# Patient Record
Sex: MALE | Race: WHITE | URBAN - NONMETROPOLITAN AREA
[De-identification: names, ages, dates, MRNs, and addresses within clinical notes are randomized per-mention and may not be internally consistent; named-entity substitution may affect disease eponyms.]

---

## 2017-02-15 ENCOUNTER — DOCTOR'S OFFICE (OUTPATIENT)
Dept: URBAN - NONMETROPOLITAN AREA CLINIC 2 | Facility: CLINIC | Age: 42
Setting detail: OPHTHALMOLOGY
End: 2017-02-15
Payer: COMMERCIAL

## 2017-02-15 ENCOUNTER — RX ONLY (RX ONLY)
Age: 42
End: 2017-02-15

## 2017-02-15 DIAGNOSIS — H50.331: ICD-10-CM

## 2017-02-15 PROCEDURE — 92004 COMPRE OPH EXAM NEW PT 1/>: CPT | Performed by: OPHTHALMOLOGY

## 2017-02-15 PROCEDURE — 92060 SENSORIMOTOR EXAMINATION: CPT | Performed by: OPHTHALMOLOGY

## 2017-02-15 ASSESSMENT — REFRACTION_MANIFEST
OU_VA: 20/
OS_VA1: 20/
OD_VA3: 20/
OD_VA1: 20/
OS_VA1: 20/
OD_VA2: 20/
OS_VA1: 20/
OD_VA2: 20/
OS_VA3: 20/
OS_VA2: 20/
OS_VA3: 20/
OD_VA1: 20/
OD_VA1: 20/
OD_VA3: 20/
OU_VA: 20/
OU_VA: 20/
OS_VA3: 20/
OD_VA3: 20/
OS_VA2: 20/
OS_VA2: 20/
OD_VA2: 20/

## 2017-02-15 ASSESSMENT — REFRACTION_CURRENTRX
OD_OVR_VA: 20/
OD_AXIS: 003
OS_OVR_VA: 20/
OD_SPHERE: -0.50
OD_OVR_VA: 20/
OD_OVR_VA: 20/
OS_AXIS: 035
OS_SPHERE: +0.25
OD_CYLINDER: -0.50
OS_OVR_VA: 20/
OS_CYLINDER: -0.50
OS_OVR_VA: 20/

## 2017-02-15 ASSESSMENT — REFRACTION_AUTOREFRACTION
OS_AXIS: 044
OS_SPHERE: +0.25
OD_SPHERE: -0.50
OD_CYLINDER: -1.50
OS_CYLINDER: -0.50
OD_AXIS: 167

## 2017-02-15 ASSESSMENT — VISUAL ACUITY
OD_BCVA: 20/25-2
OS_BCVA: 20/25

## 2017-02-15 ASSESSMENT — CONFRONTATIONAL VISUAL FIELD TEST (CVF)
OD_FINDINGS: FULL
OS_FINDINGS: FULL

## 2017-02-15 ASSESSMENT — SPHEQUIV_DERIVED
OD_SPHEQUIV: -1.25
OS_SPHEQUIV: 0

## 2017-04-19 ENCOUNTER — OPTICAL OFFICE (OUTPATIENT)
Dept: URBAN - NONMETROPOLITAN AREA CLINIC 5 | Facility: CLINIC | Age: 42
Setting detail: OPHTHALMOLOGY
End: 2017-04-19

## 2017-04-19 ENCOUNTER — DOCTOR'S OFFICE (OUTPATIENT)
Dept: URBAN - NONMETROPOLITAN AREA CLINIC 2 | Facility: CLINIC | Age: 42
Setting detail: OPHTHALMOLOGY
End: 2017-04-19
Payer: COMMERCIAL

## 2017-04-19 DIAGNOSIS — H52.223: ICD-10-CM

## 2017-04-19 DIAGNOSIS — H52.03: ICD-10-CM

## 2017-04-19 PROCEDURE — 92015 DETERMINE REFRACTIVE STATE: CPT | Performed by: OPTOMETRIST

## 2017-04-19 PROCEDURE — V2103 SPHEROCYLINDR 4.00D/12-2.00D: HCPCS | Performed by: OPTOMETRIST

## 2017-04-19 PROCEDURE — V2020 VISION SVCS FRAMES PURCHASES: HCPCS | Performed by: OPTOMETRIST

## 2017-04-19 PROCEDURE — V2750 ANTI-REFLECTIVE COATING: HCPCS | Performed by: OPTOMETRIST

## 2017-04-19 ASSESSMENT — REFRACTION_CURRENTRX
OD_OVR_VA: 20/
OD_AXIS: 003
OS_OVR_VA: 20/
OD_OVR_VA: 20/
OD_CYLINDER: -0.50
OS_SPHERE: +0.25
OS_VPRISM_DIRECTION: SV
OD_SPHERE: -0.50
OS_OVR_VA: 20/
OD_VPRISM_DIRECTION: SV
OD_OVR_VA: 20/
OS_AXIS: 035
OS_CYLINDER: -0.50
OS_OVR_VA: 20/

## 2017-04-19 ASSESSMENT — REFRACTION_MANIFEST
OU_VA: 20/
OS_VA3: 20/
OD_VA3: 20/
OS_VA1: 20/
OD_VA1: 20/
OD_VA3: 20/
OS_VA3: 20/
OS_VA1: 20/
OU_VA: 20/
OD_VA2: 20/
OD_VA2: 20/
OS_VA2: 20/
OD_VA1: 20/
OS_VA2: 20/

## 2017-04-19 ASSESSMENT — REFRACTION_OUTSIDERX
OD_SPHERE: PL
OS_AXIS: 040
OS_VA2: 20/20
OS_CYLINDER: -0.50
OD_CYLINDER: -0.75
OS_VA1: 20/20
OD_VA2: 20/25
OD_VA1: 20/25
OS_VA3: 20/
OD_AXIS: 170
OD_VA3: 20/
OU_VA: 20/20
OS_SPHERE: +0.25

## 2017-04-19 ASSESSMENT — REFRACTION_AUTOREFRACTION
OD_AXIS: 140
OD_CYLINDER: -1.25
OS_CYLINDER: -0.75
OS_SPHERE: +0.50
OD_SPHERE: -2.00
OS_AXIS: 067

## 2017-04-19 ASSESSMENT — KERATOMETRY
OS_K2POWER_DIOPTERS: 44.50
OS_K1POWER_DIOPTERS: 44.25
OS_AXISANGLE_DEGREES: 003

## 2017-04-19 ASSESSMENT — VISUAL ACUITY
OS_BCVA: 20/25
OD_BCVA: 20/20

## 2017-04-19 ASSESSMENT — SPHEQUIV_DERIVED
OD_SPHEQUIV: -2.625
OS_SPHEQUIV: 0.125

## 2017-04-19 ASSESSMENT — AXIALLENGTH_DERIVED: OS_AL: 23.2275

## 2019-12-30 ENCOUNTER — OPTICAL OFFICE (OUTPATIENT)
Dept: URBAN - NONMETROPOLITAN AREA CLINIC 5 | Facility: CLINIC | Age: 44
Setting detail: OPHTHALMOLOGY
End: 2019-12-30
Payer: COMMERCIAL

## 2019-12-30 ENCOUNTER — DOCTOR'S OFFICE (OUTPATIENT)
Dept: URBAN - NONMETROPOLITAN AREA CLINIC 2 | Facility: CLINIC | Age: 44
Setting detail: OPHTHALMOLOGY
End: 2019-12-30
Payer: COMMERCIAL

## 2019-12-30 DIAGNOSIS — H52.223: ICD-10-CM

## 2019-12-30 DIAGNOSIS — H52.03: ICD-10-CM

## 2019-12-30 DIAGNOSIS — H50.331: ICD-10-CM

## 2019-12-30 PROCEDURE — V2784 LENS POLYCARB OR EQUAL: HCPCS | Performed by: OPTOMETRIST

## 2019-12-30 PROCEDURE — 92015 DETERMINE REFRACTIVE STATE: CPT | Performed by: OPTOMETRIST

## 2019-12-30 PROCEDURE — V2103 SPHEROCYLINDR 4.00D/12-2.00D: HCPCS | Performed by: OPTOMETRIST

## 2019-12-30 PROCEDURE — V2020 VISION SVCS FRAMES PURCHASES: HCPCS | Performed by: OPTOMETRIST

## 2019-12-30 PROCEDURE — V2102 SINGL VISN SPHERE 7.12-20.00: HCPCS | Performed by: OPTOMETRIST

## 2019-12-30 PROCEDURE — 92014 COMPRE OPH EXAM EST PT 1/>: CPT | Performed by: OPTOMETRIST

## 2019-12-30 ASSESSMENT — REFRACTION_CURRENTRX
OS_AXIS: 035
OD_VPRISM_DIRECTION: SV
OS_OVR_VA: 20/
OS_SPHERE: +0.25
OD_SPHERE: PL
OS_CYLINDER: -0.50
OD_CYLINDER: -0.75
OD_OVR_VA: 20/
OS_VPRISM_DIRECTION: SV
OD_AXIS: 170

## 2019-12-30 ASSESSMENT — REFRACTION_MANIFEST
OS_CYLINDER: -0.75
OS_VA3: 20/
OD_SPHERE: PL
OS_VA2: 20/25
OS_SPHERE: +0.25
OD_VA1: 20/20-2
OD_AXIS: 170
OS_AXIS: 040
OD_VA3: 20/
OS_VA1: 20/20
OU_VA: 20/20
OD_CYLINDER: -0.75
OD_VA2: 20/25

## 2019-12-30 ASSESSMENT — SPHEQUIV_DERIVED
OS_SPHEQUIV: -0.5
OS_SPHEQUIV: -0.125
OD_SPHEQUIV: -1.375

## 2019-12-30 ASSESSMENT — KERATOMETRY
OS_K1POWER_DIOPTERS: 44.25
OD_AXISANGLE_DEGREES: 003
OS_AXISANGLE_DEGREES: 027
OS_K2POWER_DIOPTERS: 45.00
OD_K2POWER_DIOPTERS: 45.75
OD_K1POWER_DIOPTERS: 44.50

## 2019-12-30 ASSESSMENT — CONFRONTATIONAL VISUAL FIELD TEST (CVF)
OS_FINDINGS: FULL
OD_FINDINGS: FULL

## 2019-12-30 ASSESSMENT — REFRACTION_AUTOREFRACTION
OS_CYLINDER: -1.50
OS_SPHERE: +0.25
OD_SPHERE: -0.50
OS_AXIS: 016
OD_AXIS: 008
OD_CYLINDER: -1.75

## 2019-12-30 ASSESSMENT — AXIALLENGTH_DERIVED
OS_AL: 23.3755
OD_AL: 23.5309
OS_AL: 23.2329

## 2019-12-30 ASSESSMENT — VISUAL ACUITY
OS_BCVA: 20/25-1
OD_BCVA: 20/20

## 2021-05-03 ENCOUNTER — OPTICAL OFFICE (OUTPATIENT)
Dept: URBAN - NONMETROPOLITAN AREA CLINIC 5 | Facility: CLINIC | Age: 46
Setting detail: OPHTHALMOLOGY
End: 2021-05-03
Payer: COMMERCIAL

## 2021-05-03 ENCOUNTER — DOCTOR'S OFFICE (OUTPATIENT)
Dept: URBAN - NONMETROPOLITAN AREA CLINIC 2 | Facility: CLINIC | Age: 46
Setting detail: OPHTHALMOLOGY
End: 2021-05-03
Payer: COMMERCIAL

## 2021-05-03 DIAGNOSIS — H52.223: ICD-10-CM

## 2021-05-03 DIAGNOSIS — H52.4: ICD-10-CM

## 2021-05-03 DIAGNOSIS — H50.331: ICD-10-CM

## 2021-05-03 PROCEDURE — V2103 SPHEROCYLINDR 4.00D/12-2.00D: HCPCS | Performed by: OPTOMETRIST

## 2021-05-03 PROCEDURE — V2784 LENS POLYCARB OR EQUAL: HCPCS | Performed by: OPTOMETRIST

## 2021-05-03 PROCEDURE — V2102 SINGL VISN SPHERE 7.12-20.00: HCPCS | Performed by: OPTOMETRIST

## 2021-05-03 PROCEDURE — V2020 VISION SVCS FRAMES PURCHASES: HCPCS | Performed by: OPTOMETRIST

## 2021-05-03 PROCEDURE — 92015 DETERMINE REFRACTIVE STATE: CPT | Performed by: OPTOMETRIST

## 2021-05-03 PROCEDURE — 92014 COMPRE OPH EXAM EST PT 1/>: CPT | Performed by: OPTOMETRIST

## 2021-05-03 ASSESSMENT — REFRACTION_MANIFEST
OD_AXIS: 180
OD_CYLINDER: -0.75
OU_VA: 20/20
OS_ADD: +1.50
OS_CYLINDER: -0.75
OS_VA2: 20/25
OS_SPHERE: -0.25
OD_VA2: 20/25
OS_VA1: 20/20
OD_SPHERE: -0.50
OD_VA1: 20/20-2
OS_AXIS: 040
OD_ADD: +1.50

## 2021-05-03 ASSESSMENT — REFRACTION_CURRENTRX
OS_CYLINDER: -0.75
OD_AXIS: 170
OD_VPRISM_DIRECTION: SV
OS_OVR_VA: 20/
OS_SPHERE: +0.25
OD_SPHERE: PL
OD_CYLINDER: -0.75
OS_VPRISM_DIRECTION: SV
OD_OVR_VA: 20/
OS_AXIS: 041

## 2021-05-03 ASSESSMENT — REFRACTION_AUTOREFRACTION
OS_SPHERE: -0.25
OS_CYLINDER: -0.50
OD_SPHERE: -0.50
OD_AXIS: 002
OD_CYLINDER: -0.75
OS_AXIS: 019

## 2021-05-03 ASSESSMENT — KERATOMETRY
OD_K2POWER_DIOPTERS: 45.75
OS_AXISANGLE_DEGREES: 039
OD_K1POWER_DIOPTERS: 45.25
OD_AXISANGLE_DEGREES: 084
OS_K2POWER_DIOPTERS: 45.25
OS_K1POWER_DIOPTERS: 44.75

## 2021-05-03 ASSESSMENT — AXIALLENGTH_DERIVED
OD_AL: 23.2047
OD_AL: 23.2047
OS_AL: 23.241
OS_AL: 23.2884

## 2021-05-03 ASSESSMENT — TONOMETRY
OD_IOP_MMHG: 15
OS_IOP_MMHG: 15

## 2021-05-03 ASSESSMENT — CONFRONTATIONAL VISUAL FIELD TEST (CVF)
OS_FINDINGS: FULL
OD_FINDINGS: FULL

## 2021-05-03 ASSESSMENT — SPHEQUIV_DERIVED
OS_SPHEQUIV: -0.625
OD_SPHEQUIV: -0.875
OD_SPHEQUIV: -0.875
OS_SPHEQUIV: -0.5

## 2021-05-03 ASSESSMENT — VISUAL ACUITY
OD_BCVA: 20/20-2
OS_BCVA: 20/30

## 2021-10-25 LAB
EXTERNAL HIV SCREEN: NORMAL
HCV AB SER-ACNC: NEGATIVE

## 2022-05-02 ENCOUNTER — DOCTOR'S OFFICE (OUTPATIENT)
Dept: URBAN - NONMETROPOLITAN AREA CLINIC 1 | Facility: CLINIC | Age: 47
Setting detail: OPHTHALMOLOGY
End: 2022-05-02
Payer: COMMERCIAL

## 2022-05-02 DIAGNOSIS — H04.123: ICD-10-CM

## 2022-05-02 DIAGNOSIS — H25.13: ICD-10-CM

## 2022-05-02 DIAGNOSIS — E11.9: ICD-10-CM

## 2022-05-02 DIAGNOSIS — H50.331: ICD-10-CM

## 2022-05-02 DIAGNOSIS — H35.371: ICD-10-CM

## 2022-05-02 PROBLEM — H52.223 ASTIGMATISM, REGULAR; BOTH EYES: Status: ACTIVE | Noted: 2017-04-19

## 2022-05-02 PROCEDURE — 99214 OFFICE O/P EST MOD 30 MIN: CPT | Performed by: OPHTHALMOLOGY

## 2022-05-02 PROCEDURE — 92134 CPTRZ OPH DX IMG PST SGM RTA: CPT | Performed by: OPHTHALMOLOGY

## 2022-05-02 ASSESSMENT — REFRACTION_CURRENTRX
OD_OVR_VA: 20/
OS_AXIS: 041
OS_OVR_VA: 20/
OS_VPRISM_DIRECTION: SV
OD_VPRISM_DIRECTION: SV
OS_SPHERE: +0.25
OS_CYLINDER: -0.75
OD_SPHERE: PL
OD_CYLINDER: -0.75
OD_AXIS: 170

## 2022-05-02 ASSESSMENT — KERATOMETRY
OD_K1POWER_DIOPTERS: 44.50
OS_K1POWER_DIOPTERS: 44.25
OD_K2POWER_DIOPTERS: 45.50
OS_K2POWER_DIOPTERS: 45.25
OD_AXISANGLE_DEGREES: 079
OS_AXISANGLE_DEGREES: 092

## 2022-05-02 ASSESSMENT — REFRACTION_MANIFEST
OS_CYLINDER: -0.75
OD_AXIS: 180
OS_VA2: 20/25
OS_ADD: +1.50
OD_VA1: 20/20-2
OS_AXIS: 040
OD_SPHERE: -0.50
OD_ADD: +1.50
OD_CYLINDER: -0.75
OS_VA1: 20/20
OU_VA: 20/20
OD_VA2: 20/25
OS_SPHERE: -0.25

## 2022-05-02 ASSESSMENT — SPHEQUIV_DERIVED
OD_SPHEQUIV: -0.875
OS_SPHEQUIV: -0.625
OD_SPHEQUIV: -0.25

## 2022-05-02 ASSESSMENT — VISUAL ACUITY
OD_BCVA: 20/20
OS_BCVA: 20/30

## 2022-05-02 ASSESSMENT — AXIALLENGTH_DERIVED
OD_AL: 23.3837
OS_AL: 23.3782
OD_AL: 23.1469

## 2022-05-02 ASSESSMENT — CONFRONTATIONAL VISUAL FIELD TEST (CVF)
OS_FINDINGS: FULL
OD_FINDINGS: FULL

## 2022-05-02 ASSESSMENT — REFRACTION_AUTOREFRACTION
OD_SPHERE: +0.25
OS_SPHERE: 0.00
OD_CYLINDER: -1.00
OD_AXIS: 025

## 2023-04-25 ENCOUNTER — DOCTOR'S OFFICE (OUTPATIENT)
Dept: URBAN - NONMETROPOLITAN AREA CLINIC 2 | Facility: CLINIC | Age: 48
Setting detail: OPHTHALMOLOGY
End: 2023-04-25
Payer: OTHER MISCELLANEOUS

## 2023-04-25 DIAGNOSIS — Z02.71: ICD-10-CM

## 2023-04-25 PROCEDURE — MEDICAL RE MEDICAL RECORDS: Performed by: OPHTHALMOLOGY

## 2023-05-15 ENCOUNTER — DOCTOR'S OFFICE (OUTPATIENT)
Dept: URBAN - NONMETROPOLITAN AREA CLINIC 1 | Facility: CLINIC | Age: 48
Setting detail: OPHTHALMOLOGY
End: 2023-05-15
Payer: COMMERCIAL

## 2023-05-15 DIAGNOSIS — H35.341: ICD-10-CM

## 2023-05-15 DIAGNOSIS — H04.123: ICD-10-CM

## 2023-05-15 DIAGNOSIS — H50.52: ICD-10-CM

## 2023-05-15 DIAGNOSIS — H35.373: ICD-10-CM

## 2023-05-15 DIAGNOSIS — E11.9: ICD-10-CM

## 2023-05-15 DIAGNOSIS — H25.13: ICD-10-CM

## 2023-05-15 PROCEDURE — 92134 CPTRZ OPH DX IMG PST SGM RTA: CPT | Performed by: OPHTHALMOLOGY

## 2023-05-15 PROCEDURE — 92014 COMPRE OPH EXAM EST PT 1/>: CPT | Performed by: OPHTHALMOLOGY

## 2023-05-15 ASSESSMENT — AXIALLENGTH_DERIVED
OS_AL: 23.3782
OD_AL: 23.1966
OS_AL: 23.0482
OD_AL: 23.3387

## 2023-05-15 ASSESSMENT — VISUAL ACUITY
OS_BCVA: 20/25
OD_BCVA: 20/20

## 2023-05-15 ASSESSMENT — DRY EYES - PHYSICIAN NOTES
OS_GENERALCOMMENTS: TRACE PEE
OD_GENERALCOMMENTS: TRACE PEE

## 2023-05-15 ASSESSMENT — REFRACTION_MANIFEST
OS_VA2: 20/25
OD_AXIS: 180
OD_VA1: 20/20-2
OS_ADD: +1.50
OS_SPHERE: -0.25
OD_SPHERE: -0.50
OU_VA: 20/20
OD_CYLINDER: -0.75
OD_VA2: 20/25
OS_AXIS: 040
OD_ADD: +1.50
OS_CYLINDER: -0.75
OS_VA1: 20/20

## 2023-05-15 ASSESSMENT — SPHEQUIV_DERIVED
OS_SPHEQUIV: -0.625
OD_SPHEQUIV: -0.875
OS_SPHEQUIV: 0.25
OD_SPHEQUIV: -0.5

## 2023-05-15 ASSESSMENT — KERATOMETRY
OS_K2POWER_DIOPTERS: 45.00
OD_K2POWER_DIOPTERS: 45.50
OS_K1POWER_DIOPTERS: 44.50
OD_K1POWER_DIOPTERS: 44.75
OD_AXISANGLE_DEGREES: 083
OS_AXISANGLE_DEGREES: 124

## 2023-05-15 ASSESSMENT — REFRACTION_CURRENTRX
OD_SPHERE: PL
OD_VPRISM_DIRECTION: SV
OD_OVR_VA: 20/
OD_AXIS: 170
OS_OVR_VA: 20/
OS_SPHERE: +0.25
OD_CYLINDER: -0.75
OS_CYLINDER: -0.75
OS_VPRISM_DIRECTION: SV
OS_AXIS: 041

## 2023-05-15 ASSESSMENT — REFRACTION_AUTOREFRACTION
OS_CYLINDER: -0.50
OD_CYLINDER: -0.50
OD_AXIS: 156
OD_SPHERE: -0.25
OS_AXIS: 037
OS_SPHERE: +0.50

## 2023-05-15 ASSESSMENT — CONFRONTATIONAL VISUAL FIELD TEST (CVF)
OD_FINDINGS: FULL
OS_FINDINGS: FULL

## 2023-05-22 ENCOUNTER — DOCTOR'S OFFICE (OUTPATIENT)
Dept: URBAN - NONMETROPOLITAN AREA CLINIC 1 | Facility: CLINIC | Age: 48
Setting detail: OPHTHALMOLOGY
End: 2023-05-22
Payer: COMMERCIAL

## 2023-05-22 DIAGNOSIS — H50.331: ICD-10-CM

## 2023-05-22 DIAGNOSIS — G71.12: ICD-10-CM

## 2023-05-22 PROBLEM — H35.373 ERM; BOTH EYES: Status: ACTIVE | Noted: 2023-05-15

## 2023-05-22 PROBLEM — H35.341 MACULAR HOLE OR PSEUDOHOLE; RIGHT EYE: Status: ACTIVE | Noted: 2023-05-15

## 2023-05-22 PROCEDURE — 99214 OFFICE O/P EST MOD 30 MIN: CPT | Performed by: OPHTHALMOLOGY

## 2023-05-22 ASSESSMENT — REFRACTION_AUTOREFRACTION
OS_CYLINDER: -0.50
OD_SPHERE: -0.75
OS_AXIS: 060
OD_CYLINDER: -0.25
OD_AXIS: 178
OS_SPHERE: +0.25

## 2023-05-22 ASSESSMENT — REFRACTION_CURRENTRX
OS_AXIS: 039
OS_CYLINDER: -0.75
OS_VPRISM_DIRECTION: SV
OS_OVR_VA: 20/
OD_AXIS: 001
OD_VPRISM_DIRECTION: SV
OD_SPHERE: -0.50
OS_SPHERE: -0.25
OD_CYLINDER: -1.00
OD_OVR_VA: 20/

## 2023-05-22 ASSESSMENT — REFRACTION_MANIFEST
OS_SPHERE: -0.25
OS_VA2: 20/25
OU_VA: 20/20
OD_VA2: 20/25
OS_ADD: +1.50
OD_VA1: 20/20-2
OS_VA1: 20/20
OD_ADD: +1.50
OD_SPHERE: -0.50
OD_AXIS: 180
OS_CYLINDER: -0.75
OD_CYLINDER: -0.75
OS_AXIS: 040

## 2023-05-22 ASSESSMENT — AXIALLENGTH_DERIVED
OS_AL: 23.1415
OD_AL: 23.3387
OD_AL: 23.3387
OS_AL: 23.3782

## 2023-05-22 ASSESSMENT — VISUAL ACUITY
OD_BCVA: 20/20--1
OS_BCVA: 20/25

## 2023-05-22 ASSESSMENT — CONFRONTATIONAL VISUAL FIELD TEST (CVF)
OS_FINDINGS: FULL
OD_FINDINGS: FULL

## 2023-05-22 ASSESSMENT — DRY EYES - PHYSICIAN NOTES
OD_GENERALCOMMENTS: TRACE PEE
OS_GENERALCOMMENTS: TRACE PEE

## 2023-05-22 ASSESSMENT — KERATOMETRY
OS_K2POWER_DIOPTERS: 45.00
OS_K1POWER_DIOPTERS: 44.50
OS_AXISANGLE_DEGREES: 124
OD_K1POWER_DIOPTERS: 44.75
OD_AXISANGLE_DEGREES: 083
OD_K2POWER_DIOPTERS: 45.50

## 2023-05-22 ASSESSMENT — SPHEQUIV_DERIVED
OS_SPHEQUIV: -0.625
OD_SPHEQUIV: -0.875
OS_SPHEQUIV: 0
OD_SPHEQUIV: -0.875

## 2023-08-16 ENCOUNTER — DOCTOR'S OFFICE (OUTPATIENT)
Dept: URBAN - NONMETROPOLITAN AREA CLINIC 2 | Facility: CLINIC | Age: 48
Setting detail: OPHTHALMOLOGY
End: 2023-08-16
Payer: COMMERCIAL

## 2023-08-16 DIAGNOSIS — H50.331: ICD-10-CM

## 2023-08-16 PROCEDURE — 99213 OFFICE O/P EST LOW 20 MIN: CPT | Performed by: OPHTHALMOLOGY

## 2023-08-16 ASSESSMENT — SPHEQUIV_DERIVED
OD_SPHEQUIV: -0.875
OS_SPHEQUIV: -0.625
OD_SPHEQUIV: -0.875
OS_SPHEQUIV: 0

## 2023-08-16 ASSESSMENT — REFRACTION_CURRENTRX
OS_SPHERE: -0.25
OS_AXIS: 039
OD_SPHERE: -0.50
OS_OVR_VA: 20/
OD_VPRISM_DIRECTION: SV
OD_OVR_VA: 20/
OD_CYLINDER: -1.00
OS_VPRISM_DIRECTION: SV
OS_CYLINDER: -0.75
OD_AXIS: 001

## 2023-08-16 ASSESSMENT — DRY EYES - PHYSICIAN NOTES
OD_GENERALCOMMENTS: TRACE PEE
OS_GENERALCOMMENTS: TRACE PEE

## 2023-08-16 ASSESSMENT — VISUAL ACUITY
OS_BCVA: 20/25
OD_BCVA: 20/20

## 2023-08-16 ASSESSMENT — REFRACTION_AUTOREFRACTION
OD_CYLINDER: -0.25
OS_CYLINDER: -0.50
OS_AXIS: 060
OD_SPHERE: -0.75
OS_SPHERE: +0.25
OD_AXIS: 178

## 2023-08-16 ASSESSMENT — REFRACTION_MANIFEST
OD_AXIS: 180
OD_SPHERE: -0.50
OS_VA2: 20/25
OD_VA2: 20/25
OS_ADD: +1.50
OD_CYLINDER: -0.75
OU_VA: 20/20
OS_AXIS: 040
OD_ADD: +1.50
OS_SPHERE: -0.25
OS_VA1: 20/20
OS_CYLINDER: -0.75
OD_VA1: 20/20-2

## 2023-08-16 ASSESSMENT — AXIALLENGTH_DERIVED
OD_AL: 23.3387
OS_AL: 23.3782
OS_AL: 23.1415
OD_AL: 23.3387

## 2023-08-16 ASSESSMENT — CONFRONTATIONAL VISUAL FIELD TEST (CVF)
OS_FINDINGS: FULL
OD_FINDINGS: FULL

## 2023-10-09 ENCOUNTER — OFFICE VISIT (OUTPATIENT)
Dept: FAMILY MEDICINE CLINIC | Facility: CLINIC | Age: 48
End: 2023-10-09
Payer: COMMERCIAL

## 2023-10-09 VITALS
HEIGHT: 67 IN | OXYGEN SATURATION: 96 % | SYSTOLIC BLOOD PRESSURE: 142 MMHG | BODY MASS INDEX: 30.29 KG/M2 | TEMPERATURE: 98.3 F | HEART RATE: 77 BPM | RESPIRATION RATE: 18 BRPM | DIASTOLIC BLOOD PRESSURE: 86 MMHG | WEIGHT: 193 LBS

## 2023-10-09 DIAGNOSIS — K21.9 GASTROESOPHAGEAL REFLUX DISEASE, UNSPECIFIED WHETHER ESOPHAGITIS PRESENT: ICD-10-CM

## 2023-10-09 DIAGNOSIS — E78.5 DYSLIPIDEMIA, GOAL LDL BELOW 130: ICD-10-CM

## 2023-10-09 DIAGNOSIS — I1A.0 RESISTANT HYPERTENSION: Primary | ICD-10-CM

## 2023-10-09 DIAGNOSIS — F41.1 GENERALIZED ANXIETY DISORDER: ICD-10-CM

## 2023-10-09 DIAGNOSIS — G71.12: ICD-10-CM

## 2023-10-09 DIAGNOSIS — I10 ESSENTIAL HYPERTENSION WITH GOAL BLOOD PRESSURE LESS THAN 140/90: ICD-10-CM

## 2023-10-09 DIAGNOSIS — E11.9 TYPE 2 DIABETES MELLITUS WITH HEMOGLOBIN A1C GOAL OF LESS THAN 7.0% (HCC): ICD-10-CM

## 2023-10-09 DIAGNOSIS — Z23 NEEDS FLU SHOT: ICD-10-CM

## 2023-10-09 DIAGNOSIS — R79.89 ABNORMAL LFTS: ICD-10-CM

## 2023-10-09 PROBLEM — G62.9 NEUROPATHY: Status: ACTIVE | Noted: 2023-02-24

## 2023-10-09 PROBLEM — K76.0 HEPATIC STEATOSIS: Status: ACTIVE | Noted: 2020-07-02

## 2023-10-09 PROBLEM — H25.13 AGE-RELATED NUCLEAR CATARACT, BILATERAL: Status: ACTIVE | Noted: 2022-10-19

## 2023-10-09 PROCEDURE — 90686 IIV4 VACC NO PRSV 0.5 ML IM: CPT | Performed by: FAMILY MEDICINE

## 2023-10-09 PROCEDURE — 99205 OFFICE O/P NEW HI 60 MIN: CPT | Performed by: FAMILY MEDICINE

## 2023-10-09 PROCEDURE — 90471 IMMUNIZATION ADMIN: CPT | Performed by: FAMILY MEDICINE

## 2023-10-09 RX ORDER — METOPROLOL SUCCINATE 50 MG/1
TABLET, EXTENDED RELEASE ORAL
COMMUNITY
Start: 2023-07-17

## 2023-10-09 RX ORDER — LAMOTRIGINE 25 MG/1
100 TABLET ORAL EVERY MORNING
COMMUNITY
Start: 2023-08-15

## 2023-10-09 RX ORDER — AMLODIPINE BESYLATE 10 MG/1
10 TABLET ORAL EVERY MORNING
COMMUNITY
Start: 2023-09-07

## 2023-10-09 RX ORDER — CLONIDINE HYDROCHLORIDE 0.1 MG/1
0.1 TABLET ORAL 2 TIMES DAILY PRN
Qty: 60 TABLET | Refills: 5 | Status: SHIPPED | OUTPATIENT
Start: 2023-10-09

## 2023-10-09 RX ORDER — EMPAGLIFLOZIN 10 MG/1
10 TABLET, FILM COATED ORAL EVERY MORNING
COMMUNITY
Start: 2023-07-31

## 2023-10-09 RX ORDER — OLMESARTAN MEDOXOMIL AND HYDROCHLOROTHIAZIDE 40/25 40; 25 MG/1; MG/1
1 TABLET ORAL DAILY
Qty: 90 TABLET | Refills: 3 | Status: SHIPPED | OUTPATIENT
Start: 2023-10-09

## 2023-10-09 RX ORDER — FAMOTIDINE 20 MG/1
20 TABLET, FILM COATED ORAL 2 TIMES DAILY
Qty: 180 TABLET | Refills: 3 | Status: SHIPPED | OUTPATIENT
Start: 2023-10-09 | End: 2024-10-03

## 2023-10-09 RX ORDER — LOSARTAN POTASSIUM AND HYDROCHLOROTHIAZIDE 25; 100 MG/1; MG/1
1 TABLET ORAL EVERY MORNING
COMMUNITY
Start: 2023-07-18 | End: 2023-10-09

## 2023-10-09 RX ORDER — DULOXETIN HYDROCHLORIDE 60 MG/1
60 CAPSULE, DELAYED RELEASE ORAL EVERY MORNING
COMMUNITY
Start: 2023-07-31

## 2023-10-09 RX ORDER — OMEPRAZOLE 20 MG/1
20 CAPSULE, DELAYED RELEASE ORAL EVERY MORNING
COMMUNITY
Start: 2023-08-16

## 2023-10-09 RX ORDER — EZETIMIBE 10 MG/1
10 TABLET ORAL EVERY MORNING
COMMUNITY
Start: 2023-07-07

## 2023-10-09 RX ORDER — MEXILETINE HYDROCHLORIDE 150 MG/1
150 CAPSULE ORAL 3 TIMES DAILY
COMMUNITY

## 2023-10-10 ENCOUNTER — OPTICAL OFFICE (OUTPATIENT)
Dept: URBAN - NONMETROPOLITAN AREA CLINIC 4 | Facility: CLINIC | Age: 48
Setting detail: OPHTHALMOLOGY
End: 2023-10-10
Payer: COMMERCIAL

## 2023-10-10 ENCOUNTER — DOCTOR'S OFFICE (OUTPATIENT)
Dept: URBAN - NONMETROPOLITAN AREA CLINIC 1 | Facility: CLINIC | Age: 48
Setting detail: OPHTHALMOLOGY
End: 2023-10-10
Payer: COMMERCIAL

## 2023-10-10 DIAGNOSIS — H52.13: ICD-10-CM

## 2023-10-10 DIAGNOSIS — H52.223: ICD-10-CM

## 2023-10-10 DIAGNOSIS — H52.4: ICD-10-CM

## 2023-10-10 PROCEDURE — V2784 LENS POLYCARB OR EQUAL: HCPCS

## 2023-10-10 PROCEDURE — V2784 LENS POLYCARB OR EQUAL: HCPCS | Mod: LT

## 2023-10-10 PROCEDURE — V2103 SPHEROCYLINDR 4.00D/12-2.00D: HCPCS

## 2023-10-10 PROCEDURE — 92012 INTRM OPH EXAM EST PATIENT: CPT

## 2023-10-10 PROCEDURE — V2020 VISION SVCS FRAMES PURCHASES: HCPCS

## 2023-10-10 PROCEDURE — V2103 SPHEROCYLINDR 4.00D/12-2.00D: HCPCS | Mod: LT

## 2023-10-10 PROCEDURE — 92015 DETERMINE REFRACTIVE STATE: CPT

## 2023-10-10 ASSESSMENT — REFRACTION_MANIFEST
OD_CYLINDER: -0.50
OU_VA: 20/20
OS_ADD: +1.50
OS_VA1: 20/20
OS_SPHERE: PL
OS_VA2: 20/20
OD_SPHERE: -0.25
OD_AXIS: 125
OD_VA1: 20/20
OS_AXIS: 055
OD_ADD: +1.50
OS_CYLINDER: -0.75
OD_VA2: 20/20

## 2023-10-10 ASSESSMENT — REFRACTION_CURRENTRX
OS_VPRISM_DIRECTION: SV
OD_AXIS: 004
OD_OVR_VA: 20/
OS_SPHERE: -0.25
OD_SPHERE: -0.50
OD_VPRISM_DIRECTION: SV
OS_AXIS: 038
OS_CYLINDER: -0.75
OD_CYLINDER: -0.75
OS_OVR_VA: 20/

## 2023-10-10 ASSESSMENT — REFRACTION_AUTOREFRACTION
OS_SPHERE: +0.25
OD_SPHERE: -0.50
OS_CYLINDER: -0.50
OS_AXIS: 034
OD_CYLINDER: -0.75
OD_AXIS: 180

## 2023-10-10 ASSESSMENT — KERATOMETRY
OD_AXISANGLE_DEGREES: 083
OS_K2POWER_DIOPTERS: 45.00
OD_K2POWER_DIOPTERS: 45.50
OS_K1POWER_DIOPTERS: 44.50
OD_K1POWER_DIOPTERS: 44.75
OS_AXISANGLE_DEGREES: 124

## 2023-10-10 ASSESSMENT — SPHEQUIV_DERIVED
OS_SPHEQUIV: 0
OD_SPHEQUIV: -0.5
OD_SPHEQUIV: -0.875

## 2023-10-10 ASSESSMENT — VISUAL ACUITY
OS_BCVA: 20/20
OD_BCVA: 20/20

## 2023-10-10 ASSESSMENT — AXIALLENGTH_DERIVED
OD_AL: 23.1966
OD_AL: 23.3387
OS_AL: 23.1415

## 2023-10-10 ASSESSMENT — CONFRONTATIONAL VISUAL FIELD TEST (CVF)
OD_FINDINGS: FULL
OS_FINDINGS: FULL

## 2023-10-10 ASSESSMENT — DRY EYES - PHYSICIAN NOTES
OS_GENERALCOMMENTS: TRACE PEE
OD_GENERALCOMMENTS: TRACE PEE

## 2023-10-10 NOTE — PROGRESS NOTES
Name: Sim Borden      : 1975      MRN: [id number]  Encounter Provider: Jade Cervantes MD  Encounter Date: 10/9/2023   Encounter department: [facility]     1. Resistant hypertension  -     US kidney and bladder with pvr; Future; Expected date: 10/09/2023  -     olmesartan-hydrochlorothiazide (BENICAR HCT) 40-25 MG per tablet; Take 1 tablet by mouth daily  -     cloNIDine (CATAPRES) 0.1 mg tablet; Take 1 tablet (0.1 mg total) by mouth 2 (two) times a day as needed for high blood pressure  -     Metanephrine, Fractionated Plasma Free; Future  -     VMA, urine, 24 hour; Future  -     Comprehensive metabolic panel; Future; Expected date: 10/10/2023    2. Gastroesophageal reflux disease, unspecified whether esophagitis present  Assessment & Plan:  No alarm signs. Continue current. Orders:  -     famotidine (PEPCID) 20 mg tablet; Take 1 tablet (20 mg total) by mouth 2 (two) times a day    3. Type 2 diabetes mellitus with hemoglobin A1c goal of less than 7.0% [facility])  Assessment & Plan:    Lab Results   Component Value Date    HGBA1C 6.4 (H) 2023   A1c at goal.  Continue current. Orders:  -     Comprehensive metabolic panel; Future; Expected date: 10/10/2023  -     HEMOGLOBIN A1C W/ EAG ESTIMATION; Future    4. Generalized anxiety disorder    5. Dyslipidemia, goal LDL below 130  Assessment & Plan:  Statins held for time being. 6. Abnormal LFTs  Assessment & Plan:  Recheck LFTs. 7. Myotonia congenita, autosomal dominant  Assessment & Plan:  Stable. Continue current. 8. Essential hypertension with goal blood pressure less than 140/90  Assessment & Plan:  BP at goal.  Continue current. 9. Needs flu shot  -     influenza vaccine, quadrivalent, 0.5 mL, preservative-free, for adult and pediatric patients 6 mos+ (AFLURIA, FLUARIX, FLULAVAL, FLUZONE)         Subjective      His BP is a bit labile. He developed a cough on lisinopril.   He has no CP or SOB. He has no HA or vision changes. He has T2DM. His A1c is at goal.  He has no side effects or hypoglycemia. His lipids are not at goal.  He had elevated LFTs and his statin was discontinued. Review of Systems   All other systems reviewed and are negative. Current Outpatient Medications on File Prior to Visit   Medication Sig   • amLODIPine (NORVASC) 10 mg tablet Take 10 mg by mouth every morning   • DULoxetine (CYMBALTA) 60 mg delayed release capsule Take 60 mg by mouth every morning   • ezetimibe (ZETIA) 10 mg tablet Take 10 mg by mouth every morning   • Jardiance 10 MG TABS tablet Take 10 mg by mouth every morning   • lamoTRIgine (LaMICtal) 25 mg tablet Take 100 mg by mouth every morning   • metFORMIN (GLUCOPHAGE) 500 mg tablet Take 500 mg by mouth 2 (two) times a day with meals   • metoprolol succinate (TOPROL-XL) 50 mg 24 hr tablet take 1/2 tablet by mouth twice a day (MORNING AND BEDTIME)   • mexiletine (MEXITIL) 150 mg capsule Take 150 mg by mouth 3 (three) times a day   • omeprazole (PriLOSEC) 20 mg delayed release capsule Take 20 mg by mouth every morning       Objective     /86   Pulse 77   Temp 98.3 °F (36.8 °C) (Tympanic)   Resp 18   Ht 5' 7" (1.702 m)   Wt 87.5 kg (193 lb)   SpO2 96%   BMI 30.23 kg/m²     Physical Exam  Vitals and nursing note reviewed. Constitutional:       Appearance: Normal appearance. He is obese. Cardiovascular:      Rate and Rhythm: Normal rate and regular rhythm. Pulses: Normal pulses. Heart sounds: Normal heart sounds. Pulmonary:      Effort: Pulmonary effort is normal.      Breath sounds: Normal breath sounds. Abdominal:      General: Abdomen is flat. Bowel sounds are normal.      Palpations: Abdomen is soft. Musculoskeletal:         General: Normal range of motion. Cervical back: Normal range of motion and neck supple. Skin:     General: Skin is warm and dry.       Capillary Refill: Capillary refill takes less than 2 seconds. Neurological:      General: No focal deficit present. Mental Status: He is alert and oriented to person, place, and time. Mental status is at baseline. Psychiatric:         Mood and Affect: Mood normal.         Behavior: Behavior normal.         Thought Content:  Thought content normal.         Judgment: Judgment normal.       Darcy Juárez MD

## 2023-10-10 NOTE — PROGRESS NOTES
Diabetic Foot Exam    Patient's shoes and socks removed. Right Foot/Ankle   Right Foot Inspection  Skin Exam: skin normal and skin intact. No dry skin, no warmth, no callus, no erythema, no maceration, no abnormal color, no pre-ulcer, no ulcer and no callus. Toe Exam: ROM and strength within normal limits. Sensory   Vibration: intact  Proprioception: intact  Monofilament testing: intact    Vascular  Capillary refills: < 3 seconds  The right DP pulse is 2+. The right PT pulse is 2+. Left Foot/Ankle  Left Foot Inspection  Skin Exam: skin normal and skin intact. No dry skin, no warmth, no erythema, no maceration, normal color, no pre-ulcer, no ulcer and no callus. Toe Exam: ROM and strength within normal limits. Sensory   Vibration: intact  Proprioception: intact  Monofilament testing: intact    Vascular  Capillary refills: < 3 seconds  The left DP pulse is 2+. The left PT pulse is 2+.      Assign Risk Category  No deformity present  No loss of protective sensation  No weak pulses  Risk: 0

## 2023-11-15 ENCOUNTER — DOCTOR'S OFFICE (OUTPATIENT)
Dept: URBAN - NONMETROPOLITAN AREA CLINIC 2 | Facility: CLINIC | Age: 48
Setting detail: OPHTHALMOLOGY
End: 2023-11-15
Payer: COMMERCIAL

## 2023-11-15 DIAGNOSIS — H50.331: ICD-10-CM

## 2023-11-15 PROCEDURE — 92012 INTRM OPH EXAM EST PATIENT: CPT | Performed by: OPHTHALMOLOGY

## 2023-11-15 ASSESSMENT — CONFRONTATIONAL VISUAL FIELD TEST (CVF)
OD_FINDINGS: FULL
OS_FINDINGS: FULL

## 2023-11-15 ASSESSMENT — DRY EYES - PHYSICIAN NOTES
OS_GENERALCOMMENTS: TRACE PEE
OD_GENERALCOMMENTS: TRACE PEE

## 2023-11-16 ASSESSMENT — REFRACTION_MANIFEST
OS_VA1: 20/20
OD_CYLINDER: -0.50
OS_ADD: +1.50
OD_AXIS: 125
OS_SPHERE: PL
OS_AXIS: 055
OD_VA2: 20/20
OS_VA2: 20/20
OD_ADD: +1.50
OD_VA1: 20/20
OU_VA: 20/20
OD_SPHERE: -0.25
OS_CYLINDER: -0.75

## 2023-11-16 ASSESSMENT — REFRACTION_CURRENTRX
OS_CYLINDER: -0.75
OD_OVR_VA: 20/
OS_VPRISM_DIRECTION: SV
OS_SPHERE: -0.25
OD_SPHERE: -0.50
OD_AXIS: 004
OS_AXIS: 038
OD_VPRISM_DIRECTION: SV
OD_CYLINDER: -0.75
OS_OVR_VA: 20/

## 2023-11-16 ASSESSMENT — KERATOMETRY
OS_K1POWER_DIOPTERS: 44.50
OS_K2POWER_DIOPTERS: 45.00
OS_AXISANGLE_DEGREES: 124
OD_K1POWER_DIOPTERS: 44.75
OD_AXISANGLE_DEGREES: 083
OD_K2POWER_DIOPTERS: 45.50

## 2023-11-16 ASSESSMENT — REFRACTION_AUTOREFRACTION
OS_AXIS: 034
OD_SPHERE: -0.50
OD_AXIS: 180
OS_CYLINDER: -0.50
OS_SPHERE: +0.25
OD_CYLINDER: -0.75

## 2023-11-16 ASSESSMENT — VISUAL ACUITY
OD_BCVA: 20/20
OS_BCVA: 20/25

## 2023-11-16 ASSESSMENT — SPHEQUIV_DERIVED
OS_SPHEQUIV: 0
OD_SPHEQUIV: -0.875
OD_SPHEQUIV: -0.5

## 2023-11-16 ASSESSMENT — AXIALLENGTH_DERIVED
OD_AL: 23.1966
OS_AL: 23.1415
OD_AL: 23.3387

## 2023-12-21 ENCOUNTER — DOCTOR'S OFFICE (OUTPATIENT)
Dept: URBAN - NONMETROPOLITAN AREA CLINIC 2 | Facility: CLINIC | Age: 48
Setting detail: OPHTHALMOLOGY
End: 2023-12-21
Payer: OTHER MISCELLANEOUS

## 2023-12-21 DIAGNOSIS — Z02.71: ICD-10-CM

## 2023-12-21 PROCEDURE — MEDICAL RE MEDICAL RECORDS: Performed by: OPHTHALMOLOGY

## 2024-01-29 ENCOUNTER — TELEPHONE (OUTPATIENT)
Dept: FAMILY MEDICINE CLINIC | Facility: CLINIC | Age: 49
End: 2024-01-29

## 2024-02-26 ENCOUNTER — TELEPHONE (OUTPATIENT)
Dept: FAMILY MEDICINE CLINIC | Facility: CLINIC | Age: 49
End: 2024-02-26

## 2024-02-26 NOTE — TELEPHONE ENCOUNTER
Patient called the RX Refill Line. Message is being forwarded to the office.     Patient is requesting someone to call him to reschedule his appointment with Dr. Gee.    Please contact patient at  [phone]

## 2024-02-26 NOTE — TELEPHONE ENCOUNTER
LVM asking for pt to call back to reschedule. Pt needs An annual physical and A1C check.   Please assist with scheduling.  Ty

## 2024-03-11 ENCOUNTER — TELEPHONE (OUTPATIENT)
Dept: ADMINISTRATIVE | Facility: OTHER | Age: 49
End: 2024-03-11

## 2024-03-11 DIAGNOSIS — I10 ESSENTIAL HYPERTENSION WITH GOAL BLOOD PRESSURE LESS THAN 140/90: Primary | ICD-10-CM

## 2024-03-11 DIAGNOSIS — K21.00 GASTROESOPHAGEAL REFLUX DISEASE WITH ESOPHAGITIS WITHOUT HEMORRHAGE: ICD-10-CM

## 2024-03-11 RX ORDER — OMEPRAZOLE 20 MG/1
20 CAPSULE, DELAYED RELEASE ORAL EVERY MORNING
Qty: 90 CAPSULE | Refills: 1 | Status: SHIPPED | OUTPATIENT
Start: 2024-03-11

## 2024-03-11 RX ORDER — AMLODIPINE BESYLATE 10 MG/1
10 TABLET ORAL EVERY MORNING
Qty: 90 TABLET | Refills: 1 | Status: SHIPPED | OUTPATIENT
Start: 2024-03-11

## 2024-03-11 NOTE — TELEPHONE ENCOUNTER
Upon review of the In Basket request we have noted that,04/19/2022 states uninterpretable - not able to be interpreted,is documented. No update.    because of this we are requesting that you forward this request/concern to the appropriate education email address. The Quality team members assigned to this email will be more than happy to assist you.    Any additional questions or concerns should be emailed to the Practice Liaisons via the appropriate education email address, please do not reply via In Basket.    Thank you  Sri Kincaid

## 2024-03-11 NOTE — TELEPHONE ENCOUNTER
----- Message from Carina Correa MA sent at 3/10/2024  3:47 PM EDT -----  Regarding: Care Gap request  03/10/24 3:47 PM    Hello, our patient attached above has had Diabetic Eye Exam completed/performed. Please assist in updating the patient chart by pulling the Care Everywhere (CE) document. The date of service is 4/19/2022.     Thank you,  Carina BURKETT

## 2024-03-11 NOTE — TELEPHONE ENCOUNTER
Upon review of the In Basket request we were able to locate, review, and update the patient chart as requested for Hepatitis C  and HIV.    Any additional questions or concerns should be emailed to the Practice Liaisons via the appropriate education email address, please do not reply via In Basket.    Thank you  Sri Kincaid

## 2024-03-11 NOTE — TELEPHONE ENCOUNTER
----- Message from Carina Correa MA sent at 3/10/2024  3:48 PM EDT -----  Regarding: Care Gap request  03/10/24 3:48 PM    Hello, our patient attached above has had Hepatitis C completed/performed. Please assist in updating the patient chart by pulling the Care Everywhere (CE) document. The date of service 2023    Hello, our patient attached above has had HIV completed/performed. Please assist in updating the patient chart by pulling the Care Everywhere (CE) document. The date of service 2021          Thank you,  Carina BURKETT

## 2024-04-17 ENCOUNTER — TELEPHONE (OUTPATIENT)
Dept: FAMILY MEDICINE CLINIC | Facility: CLINIC | Age: 49
End: 2024-04-17

## 2024-04-17 NOTE — TELEPHONE ENCOUNTER
LVM asking for pt to call back to reschedule appt on 7/5 as Dr Gee will be out of the office.   Please offer appt with Juan our PA or schedule with Dr Gee's first available.  Thank you

## 2024-05-06 DIAGNOSIS — I10 ESSENTIAL HYPERTENSION WITH GOAL BLOOD PRESSURE LESS THAN 140/90: Primary | ICD-10-CM

## 2024-05-07 RX ORDER — METOPROLOL SUCCINATE 50 MG/1
25 TABLET, EXTENDED RELEASE ORAL 2 TIMES DAILY
Qty: 90 TABLET | Refills: 3 | Status: SHIPPED | OUTPATIENT
Start: 2024-05-07

## 2024-05-20 ENCOUNTER — TELEPHONE (OUTPATIENT)
Dept: FAMILY MEDICINE CLINIC | Facility: CLINIC | Age: 49
End: 2024-05-20

## 2024-05-20 ENCOUNTER — DOCTOR'S OFFICE (OUTPATIENT)
Dept: URBAN - NONMETROPOLITAN AREA CLINIC 1 | Facility: CLINIC | Age: 49
Setting detail: OPHTHALMOLOGY
End: 2024-05-20
Payer: COMMERCIAL

## 2024-05-20 DIAGNOSIS — E11.9: ICD-10-CM

## 2024-05-20 DIAGNOSIS — H04.123: ICD-10-CM

## 2024-05-20 DIAGNOSIS — H25.13: ICD-10-CM

## 2024-05-20 DIAGNOSIS — H35.341: ICD-10-CM

## 2024-05-20 DIAGNOSIS — H35.373: ICD-10-CM

## 2024-05-20 LAB
LEFT EYE DIABETIC RETINOPATHY: NORMAL
RIGHT EYE DIABETIC RETINOPATHY: NORMAL
SEVERITY (EYE EXAM): NORMAL

## 2024-05-20 PROCEDURE — 92134 CPTRZ OPH DX IMG PST SGM RTA: CPT | Performed by: OPHTHALMOLOGY

## 2024-05-20 PROCEDURE — 92014 COMPRE OPH EXAM EST PT 1/>: CPT | Performed by: OPHTHALMOLOGY

## 2024-05-20 ASSESSMENT — CONFRONTATIONAL VISUAL FIELD TEST (CVF)
OS_FINDINGS: FULL
OD_FINDINGS: FULL

## 2024-05-20 NOTE — TELEPHONE ENCOUNTER
Lvm for patient to return call regarding diabetic eye exam     Let patient know we are offering diabetic eye exams in office on 5/21 and that the eye exam involves no dilation and the patient is able to drive themself to and from the visit.     It is scheduled as just a nurse visit so the patient doesn't need to see a provider and will take approx 20 mins to complete.

## 2024-05-22 DIAGNOSIS — E11.9 TYPE 2 DIABETES MELLITUS WITH HEMOGLOBIN A1C GOAL OF LESS THAN 7.0% (HCC): Primary | ICD-10-CM

## 2024-05-23 RX ORDER — EMPAGLIFLOZIN 10 MG/1
10 TABLET, FILM COATED ORAL EVERY MORNING
Qty: 90 TABLET | Refills: 3 | Status: SHIPPED | OUTPATIENT
Start: 2024-05-23

## 2024-05-29 ENCOUNTER — VBI (OUTPATIENT)
Dept: ADMINISTRATIVE | Facility: OTHER | Age: 49
End: 2024-05-29

## 2024-05-30 NOTE — TELEPHONE ENCOUNTER
Upon review of the In Basket request we were able to locate, review, and update the patient chart as requested for Diabetic Eye Exam.    Any additional questions or concerns should be emailed to the Practice Liaisons via the appropriate education email address, please do not reply via In Basket.    Thank you  Concepcion Galloway MA   [facility]

## 2024-05-31 ENCOUNTER — TELEPHONE (OUTPATIENT)
Dept: ADMINISTRATIVE | Facility: OTHER | Age: 49
End: 2024-05-31

## 2024-05-31 NOTE — TELEPHONE ENCOUNTER
Upon review of the In Basket request we were able to locate, review, and update the patient chart as requested for CRC: Colonoscopy.    Any additional questions or concerns should be emailed to the Practice Liaisons via the appropriate education email address, please do not reply via In Basket.    Thank you  SINDY MONTERO   [facility]

## 2024-05-31 NOTE — TELEPHONE ENCOUNTER
----- Message from Carina MEYER sent at 5/30/2024  1:32 PM EDT -----  Regarding: Care Gap request  05/30/24 1:32 PM    Hello, our patient attached above has had CRC: Colonoscopy completed/performed. Please assist in updating the patient chart by pulling the Care Everywhere (CE) document. The date of service is 2022  .     Thank you,  Carina BURKETT

## 2024-06-19 ENCOUNTER — TELEPHONE (OUTPATIENT)
Dept: FAMILY MEDICINE CLINIC | Facility: CLINIC | Age: 49
End: 2024-06-19

## 2024-06-19 NOTE — TELEPHONE ENCOUNTER
LVM asking pt to update his PCP on his [employer] insurance prior to upcoming appt.  He will need to call membership services prior to appt.

## 2024-06-25 ENCOUNTER — CONSULT (OUTPATIENT)
Dept: FAMILY MEDICINE CLINIC | Facility: CLINIC | Age: 49
End: 2024-06-25
Payer: COMMERCIAL

## 2024-06-25 VITALS
BODY MASS INDEX: 29.44 KG/M2 | DIASTOLIC BLOOD PRESSURE: 86 MMHG | SYSTOLIC BLOOD PRESSURE: 130 MMHG | TEMPERATURE: 97.9 F | WEIGHT: 188 LBS | HEART RATE: 76 BPM | OXYGEN SATURATION: 97 %

## 2024-06-25 DIAGNOSIS — Z01.818 PRE-OP EXAMINATION: Primary | ICD-10-CM

## 2024-06-25 DIAGNOSIS — H50.9 STRABISMUS: ICD-10-CM

## 2024-06-25 DIAGNOSIS — E11.9 TYPE 2 DIABETES MELLITUS WITH HEMOGLOBIN A1C GOAL OF LESS THAN 7.0% (HCC): ICD-10-CM

## 2024-06-25 LAB
CREAT UR-MCNC: 91.2 MG/DL
MICROALBUMIN UR-MCNC: <7 MG/L
SL AMB POCT HEMOGLOBIN AIC: 6.2 (ref ?–6.5)

## 2024-06-25 PROCEDURE — 99213 OFFICE O/P EST LOW 20 MIN: CPT

## 2024-06-25 PROCEDURE — 83036 HEMOGLOBIN GLYCOSYLATED A1C: CPT

## 2024-06-25 PROCEDURE — 82570 ASSAY OF URINE CREATININE: CPT

## 2024-06-25 PROCEDURE — 93000 ELECTROCARDIOGRAM COMPLETE: CPT

## 2024-06-25 PROCEDURE — 82043 UR ALBUMIN QUANTITATIVE: CPT

## 2024-06-25 RX ORDER — HYDROXYZINE HYDROCHLORIDE 25 MG/1
25 TABLET, FILM COATED ORAL
COMMUNITY
Start: 2024-04-16

## 2024-06-25 NOTE — PROGRESS NOTES
Presurgical Evaluation    Subjective:      Patient ID: Abiel Ernandez is a 48 y.o. male.    Chief Complaint   Patient presents with    Pre-op Exam     [facility] eye doing surgery 7/9       Patient is a 48-year-old male presenting for preop examination of strabismus surgery due to exotropia.  Patient has no concerns today.        The following portions of the patient's history were reviewed and updated as appropriate: allergies, current medications, past family history, past medical history, past social history, past surgical history, and problem list.    Procedure date: July 9, 2024    Surgeon:  Dr. Barkley  Planned procedure:  STRABISMUS SURGERY RECESSION OR RESECTION TWO HORIZONTAL MUSCLES   Diagnosis for procedure:  Exotropia    Prior anesthesia: Yes   General; Complications:  None / Tolerated well    CAD History: None   NOTE: Patient should see Cardiology if time available before surgery, and if appropriate.    Pulmonary History: Asthma; as kid; controlled; no albuterol use    Renal history: None    Diabetes History:  Type 2  Controlled; 6.2     Neurological History: None     On Immunosuppressant meds/biologics: No      Review of Systems   Constitutional:  Negative for appetite change, chills, diaphoresis, fatigue and fever.   HENT:  Negative for congestion, ear discharge, ear pain, postnasal drip, rhinorrhea, sinus pressure, sinus pain, sneezing and sore throat.    Eyes:  Negative for pain, discharge, redness, itching and visual disturbance.        Exotropia   Respiratory:  Negative for apnea, cough, chest tightness, shortness of breath and wheezing.    Cardiovascular:  Negative for chest pain, palpitations and leg swelling.   Gastrointestinal:  Negative for abdominal pain, blood in stool, constipation, diarrhea, nausea and vomiting.   Endocrine: Negative for cold intolerance, heat intolerance, polydipsia and polyuria.   Genitourinary:  Negative for dysuria, flank pain, frequency, hematuria and urgency.    Musculoskeletal:  Negative for arthralgias, back pain, myalgias, neck pain and neck stiffness.   Skin:  Negative for color change and rash.   Allergic/Immunologic: Negative.    Neurological:  Negative for dizziness, tremors, seizures, syncope, facial asymmetry, speech difficulty, weakness, light-headedness, numbness and headaches.   Hematological:  Negative for adenopathy. Does not bruise/bleed easily.   Psychiatric/Behavioral:  Negative for agitation, confusion, decreased concentration, dysphoric mood, hallucinations, self-injury, sleep disturbance and suicidal ideas. The patient is not nervous/anxious and is not hyperactive.    All other systems reviewed and are negative.        Current Outpatient Medications   Medication Sig Dispense Refill    amLODIPine (NORVASC) 10 mg tablet Take 1 tablet (10 mg total) by mouth every morning 90 tablet 1    cloNIDine (CATAPRES) 0.1 mg tablet Take 1 tablet (0.1 mg total) by mouth 2 (two) times a day as needed for high blood pressure 60 tablet 5    DULoxetine (CYMBALTA) 60 mg delayed release capsule Take 60 mg by mouth every morning      ezetimibe (ZETIA) 10 mg tablet Take 10 mg by mouth every morning      famotidine (PEPCID) 20 mg tablet Take 1 tablet (20 mg total) by mouth 2 (two) times a day 180 tablet 3    hydrOXYzine HCL (ATARAX) 25 mg tablet Take 25 mg by mouth daily at bedtime      Jardiance 10 MG TABS tablet Take 1 tablet (10 mg total) by mouth every morning 90 tablet 3    lamoTRIgine (LaMICtal) 25 mg tablet Take 100 mg by mouth every morning      metFORMIN (GLUCOPHAGE) 500 mg tablet Take 500 mg by mouth 2 (two) times a day with meals      metoprolol succinate (TOPROL-XL) 50 mg 24 hr tablet Take 0.5 tablets (25 mg total) by mouth 2 (two) times a day 90 tablet 3    mexiletine (MEXITIL) 150 mg capsule Take 150 mg by mouth 3 (three) times a day      olmesartan-hydrochlorothiazide (BENICAR HCT) 40-25 MG per tablet Take 1 tablet by mouth daily 90 tablet 3    omeprazole  (PriLOSEC) 20 mg delayed release capsule Take 1 capsule (20 mg total) by mouth every morning 90 capsule 1    GARLIC PO Take 100 mg by mouth daily      Multiple Vitamin (Multi-Vitamin) tablet Take 1 tablet by mouth daily       No current facility-administered medications for this visit.       Allergies on file:   Atorvastatin and Lisinopril    Patient Active Problem List   Diagnosis    Abnormal LFTs    Age-related nuclear cataract, bilateral    Dyslipidemia, goal LDL below 130    Esophageal reflux    Essential hypertension with goal blood pressure less than 140/90    Generalized anxiety disorder    Hepatic steatosis    Intermittent asthma with reliever use up to twice per week    Myotonia congenita, autosomal dominant    Neuropathy    Type 2 diabetes mellitus with hemoglobin A1c goal of less than 7.0% (HCC)        Past Medical History:   Diagnosis Date    Diabetes 1.5, managed as type 2 (HCC)     High cholesterol     Hypertension     Lazy eye, left     Myotonia congenita        Past Surgical History:   Procedure Laterality Date    HERNIA REPAIR         Family History   Problem Relation Age of Onset    Heart disease Mother     Breast cancer Mother     Heart disease Father     Lung cancer Father        Social History     Tobacco Use    Smoking status: Former     Types: Cigarettes    Smokeless tobacco: Never   Vaping Use    Vaping status: Never Used   Substance Use Topics    Alcohol use: Not Currently     Alcohol/week: 3.0 standard drinks of alcohol     Types: 3 Standard drinks or equivalent per week    Drug use: Never       Objective:    Vitals:    06/25/24 1508   BP: 130/86   BP Location: Left arm   Patient Position: Sitting   Pulse: 76   Temp: 97.9 °F (36.6 °C)   TempSrc: Tympanic   SpO2: 97%   Weight: 85.3 kg (188 lb)        Physical Exam  Vitals and nursing note reviewed.   Constitutional:       General: He is not in acute distress.     Appearance: Normal appearance. He is normal weight. He is not ill-appearing,  toxic-appearing or diaphoretic.   HENT:      Head: Normocephalic and atraumatic.      Right Ear: Tympanic membrane normal.      Left Ear: Tympanic membrane normal.      Nose: Nose normal.      Mouth/Throat:      Mouth: Mucous membranes are moist.      Pharynx: Oropharynx is clear.   Eyes:      Conjunctiva/sclera: Conjunctivae normal.      Pupils: Pupils are equal, round, and reactive to light.      Comments: Exotropia intermittent   Cardiovascular:      Rate and Rhythm: Normal rate and regular rhythm.      Pulses: Normal pulses.      Heart sounds: Normal heart sounds. No murmur heard.  Pulmonary:      Effort: Pulmonary effort is normal. No respiratory distress.      Breath sounds: Normal breath sounds. No wheezing.   Chest:      Chest wall: No tenderness.   Abdominal:      General: Bowel sounds are normal.      Palpations: Abdomen is soft. There is no mass.      Tenderness: There is no abdominal tenderness.   Musculoskeletal:         General: No tenderness. Normal range of motion.      Cervical back: Normal range of motion and neck supple. No tenderness.      Right lower leg: No edema.      Left lower leg: No edema.   Lymphadenopathy:      Cervical: No cervical adenopathy.   Skin:     General: Skin is warm.      Capillary Refill: Capillary refill takes less than 2 seconds.      Findings: No erythema, lesion or rash.   Neurological:      General: No focal deficit present.      Mental Status: He is alert and oriented to person, place, and time. Mental status is at baseline.      Motor: No weakness.      Coordination: Coordination normal.      Gait: Gait normal.   Psychiatric:         Mood and Affect: Mood normal.         Behavior: Behavior normal.         Thought Content: Thought content normal.         Judgment: Judgment normal.           Preop labs/testing available and reviewed: yes               EKG yes; NSR    Echo no    Stress test/cath no    PFT/[place] no    Functional capacity: Singles tennis                        7-12 Mets   Pick the highest level patient can comfortably perform   4 mets or greater for surgery    RCRI  High Risk surgery?         1 Point  CAD History:         1 Point   MI; Positive Stress Test; CP due to Mi;  Nitrate Usage to control Angina; Pathologic Q wave on EKG  CHF Active:         1 Point   Pulm Edema; Paroxysmal Nocturnal Dyspnea;  Bibasilar Rales (crackles);S3; CHF on CXR  Cerebrovascular Disease (TIA or CVA):     1 Point  DM on Insulin:        1 Point  Serum Creat >2.0 mg/dl:       1 Point          Total Points: 0     Scorin: Class I, Very Low Risk (0.4%)     1: Class II, Low risk (0.9%)     2: Class III Moderate (6.6%)     3: Class IV High (>11%)      CHICO Risk:  GFR:        For PCP:  If GFR>60, Hold ACE/ARB/Diuretic on the day of surgery, and NSAIDS 10 days before.    If GFR<45, Consider PRE and POST op Nephrology Consult.    If 46 <GFR> 59 : Has Patient had CHICO in last 6 Months? no   If YES: Preop Nephrology consult   If No:  Post Op Nephrology consult.           Assessment/Plan:    Patient is medically optimized (cleared) for the planned procedure.    Further testing/evaluation is not required.    Postop concerns: no    Problem List Items Addressed This Visit          Endocrine    Type 2 diabetes mellitus with hemoglobin A1c goal of less than 7.0% (HCC)     At goal today.  Continue metformin 500 mg 2 times daily with meals and Jardiance 10 mg every morning.  Educated on healthy diet and exercise.  Lab Results   Component Value Date    HGBA1C 6.2 2024            Relevant Orders    Basic metabolic panel    POCT hemoglobin A1c (Completed)    POCT ECG (Completed)    Albumin / creatinine urine ratio     Other Visit Diagnoses       Pre-op examination    -  Primary    Relevant Orders    Basic metabolic panel    POCT hemoglobin A1c (Completed)    POCT ECG (Completed)    Strabismus                 Diagnoses and all orders for this visit:    Pre-op examination  -     Basic metabolic panel;  "Future  -     POCT hemoglobin A1c  -     POCT ECG    Strabismus    Type 2 diabetes mellitus with hemoglobin A1c goal of less than 7.0% ([facility])  -     Basic metabolic panel; Future  -     POCT hemoglobin A1c  -     POCT ECG  -     Albumin / creatinine urine ratio; Future  -     Albumin / creatinine urine ratio    Other orders  -     GARLIC PO; Take 100 mg by mouth daily  -     hydrOXYzine HCL (ATARAX) 25 mg tablet; Take 25 mg by mouth daily at bedtime  -     Multiple Vitamin (Multi-Vitamin) tablet; Take 1 tablet by mouth daily          Pre-Surgery Instructions:   Medication Instructions    amLODIPine (NORVASC) 10 mg tablet per anesthesia guidelines     cloNIDine (CATAPRES) 0.1 mg tablet per anesthesia guidelines     DULoxetine (CYMBALTA) 60 mg delayed release capsule per anesthesia guidelines     ezetimibe (ZETIA) 10 mg tablet per anesthesia guidelines     famotidine (PEPCID) 20 mg tablet per anesthesia guidelines     hydrOXYzine HCL (ATARAX) 25 mg tablet per anesthesia guidelines     Jardiance 10 MG TABS tablet per anesthesia guidelines     lamoTRIgine (LaMICtal) 25 mg tablet per anesthesia guidelines     metFORMIN (GLUCOPHAGE) 500 mg tablet per anesthesia guidelines     metoprolol succinate (TOPROL-XL) 50 mg 24 hr tablet per anesthesia guidelines     mexiletine (MEXITIL) 150 mg capsule per anesthesia guidelines     olmesartan-hydrochlorothiazide (BENICAR HCT) 40-25 MG per tablet per anesthesia guidelines     omeprazole (PriLOSEC) 20 mg delayed release capsule per anesthesia guidelines         NOTE: Please use the above to review important meds for your specialty, the remainder \"per anesthesia Guidelines.\"    NOTE: Please place an Inbasket message for \"SOC\" pool for complicated patients.     "

## 2024-06-25 NOTE — ASSESSMENT & PLAN NOTE
At goal today.  Continue metformin 500 mg 2 times daily with meals and Jardiance 10 mg every morning.  Educated on healthy diet and exercise.  Lab Results   Component Value Date    HGBA1C 6.2 06/25/2024

## 2024-06-27 ENCOUNTER — DOCTOR'S OFFICE (OUTPATIENT)
Dept: URBAN - NONMETROPOLITAN AREA CLINIC 1 | Facility: CLINIC | Age: 49
Setting detail: OPHTHALMOLOGY
End: 2024-06-27
Payer: COMMERCIAL

## 2024-06-27 DIAGNOSIS — Z01.021: ICD-10-CM

## 2024-06-27 PROCEDURE — MEDICAL RE MEDICAL RECORDS: Performed by: OPHTHALMOLOGY

## 2024-07-22 ENCOUNTER — TELEPHONE (OUTPATIENT)
Age: 49
End: 2024-07-22

## 2024-07-22 NOTE — TELEPHONE ENCOUNTER
Patient is requesting an insurance referral for the following specialty:      Test Name / Order Name: eval/treat    DX Code: L foot great toe ingrown toenail; pt has DM and required podiatric care    Date Of Service: not yet scheduled for visit    Location/Facility Name/Address/Phone #: [facility] ph: [phone]     Location / Facility NPI:     Best Phone # To Reach The Patient:  [phone]

## 2024-07-23 DIAGNOSIS — L60.0 INGROWN TOENAIL OF LEFT FOOT: Primary | ICD-10-CM

## 2024-07-23 NOTE — TELEPHONE ENCOUNTER
Pt called regarding status of prior auth. Advised was being reviewed. Patient would like to know when it has gone throughj.

## 2024-09-05 DIAGNOSIS — I10 ESSENTIAL HYPERTENSION WITH GOAL BLOOD PRESSURE LESS THAN 140/90: ICD-10-CM

## 2024-09-05 DIAGNOSIS — K21.00 GASTROESOPHAGEAL REFLUX DISEASE WITH ESOPHAGITIS WITHOUT HEMORRHAGE: ICD-10-CM

## 2024-09-06 RX ORDER — AMLODIPINE BESYLATE 10 MG/1
10 TABLET ORAL EVERY MORNING
Qty: 90 TABLET | Refills: 1 | Status: SHIPPED | OUTPATIENT
Start: 2024-09-06

## 2024-09-09 ENCOUNTER — TELEPHONE (OUTPATIENT)
Age: 49
End: 2024-09-09

## 2024-09-09 NOTE — TELEPHONE ENCOUNTER
PA for omeprazole (PriLOSEC) 20 mg delayed release capsule SUBMITTED     via    []CMM-KEY:   []Chelsy-Case ID #   []Faxed to plan   [x]Other website [employer] [id number]    []Phone call Case ID #     Office notes sent, clinical questions answered. Awaiting determination    Turnaround time for your insurance to make a decision on your Prior Authorization can take 7-21 business days.          s

## 2024-09-17 DIAGNOSIS — K21.00 GASTROESOPHAGEAL REFLUX DISEASE WITH ESOPHAGITIS WITHOUT HEMORRHAGE: ICD-10-CM

## 2024-09-23 RX ORDER — TOBRAMYCIN AND DEXAMETHASONE 3; 1 MG/ML; MG/ML
1 SUSPENSION/ DROPS OPHTHALMIC 4 TIMES DAILY
COMMUNITY
Start: 2024-07-09

## 2024-09-23 RX ORDER — DULOXETIN HYDROCHLORIDE 30 MG/1
30 CAPSULE, DELAYED RELEASE ORAL EVERY MORNING
COMMUNITY
Start: 2024-07-30

## 2024-09-23 RX ORDER — BLOOD SUGAR DIAGNOSTIC
STRIP MISCELLANEOUS 2 TIMES DAILY
COMMUNITY
Start: 2024-08-12

## 2024-10-04 DIAGNOSIS — I1A.0 RESISTANT HYPERTENSION: ICD-10-CM

## 2024-10-04 RX ORDER — OLMESARTAN MEDOXOMIL AND HYDROCHLOROTHIAZIDE 40/25 40; 25 MG/1; MG/1
1 TABLET ORAL DAILY
Qty: 90 TABLET | Refills: 0 | Status: SHIPPED | OUTPATIENT
Start: 2024-10-04

## 2024-10-13 DIAGNOSIS — K21.9 GASTROESOPHAGEAL REFLUX DISEASE, UNSPECIFIED WHETHER ESOPHAGITIS PRESENT: ICD-10-CM

## 2024-10-15 RX ORDER — FAMOTIDINE 20 MG/1
20 TABLET, FILM COATED ORAL 2 TIMES DAILY
Qty: 180 TABLET | Refills: 1 | Status: SHIPPED | OUTPATIENT
Start: 2024-10-15

## 2024-10-20 DIAGNOSIS — K21.00 GASTROESOPHAGEAL REFLUX DISEASE WITH ESOPHAGITIS WITHOUT HEMORRHAGE: ICD-10-CM

## 2024-10-23 ENCOUNTER — TELEPHONE (OUTPATIENT)
Age: 49
End: 2024-10-23

## 2024-10-23 NOTE — TELEPHONE ENCOUNTER
PA for omeprazole (PriLOSEC) 20 mg  NOT REQUIRED          Pharmacy advised by    []Fax  [x]Phone call    Spoke with [facility] verified pharmacy received paid claim, copay $1. Patient picked up medication on 10/22/24

## 2024-12-03 ENCOUNTER — OFFICE VISIT (OUTPATIENT)
Dept: FAMILY MEDICINE CLINIC | Facility: CLINIC | Age: 49
End: 2024-12-03
Payer: COMMERCIAL

## 2024-12-03 VITALS
HEART RATE: 78 BPM | TEMPERATURE: 96.9 F | HEIGHT: 67 IN | WEIGHT: 189 LBS | OXYGEN SATURATION: 98 % | DIASTOLIC BLOOD PRESSURE: 78 MMHG | BODY MASS INDEX: 29.66 KG/M2 | SYSTOLIC BLOOD PRESSURE: 90 MMHG

## 2024-12-03 DIAGNOSIS — Z12.5 SCREENING FOR MALIGNANT NEOPLASM OF PROSTATE: ICD-10-CM

## 2024-12-03 DIAGNOSIS — K21.9 GASTROESOPHAGEAL REFLUX DISEASE, UNSPECIFIED WHETHER ESOPHAGITIS PRESENT: ICD-10-CM

## 2024-12-03 DIAGNOSIS — G62.9 NEUROPATHY: ICD-10-CM

## 2024-12-03 DIAGNOSIS — I10 ESSENTIAL HYPERTENSION WITH GOAL BLOOD PRESSURE LESS THAN 140/90: ICD-10-CM

## 2024-12-03 DIAGNOSIS — G71.12: ICD-10-CM

## 2024-12-03 DIAGNOSIS — Z00.00 ANNUAL PHYSICAL EXAM: Primary | ICD-10-CM

## 2024-12-03 DIAGNOSIS — F33.2 SEVERE EPISODE OF RECURRENT MAJOR DEPRESSIVE DISORDER, WITHOUT PSYCHOTIC FEATURES (HCC): ICD-10-CM

## 2024-12-03 DIAGNOSIS — J45.20 INTERMITTENT ASTHMA WITH RELIEVER USE UP TO TWICE PER WEEK WITHOUT COMPLICATION: ICD-10-CM

## 2024-12-03 DIAGNOSIS — E78.5 DYSLIPIDEMIA, GOAL LDL BELOW 130: ICD-10-CM

## 2024-12-03 DIAGNOSIS — E11.9 TYPE 2 DIABETES MELLITUS WITH HEMOGLOBIN A1C GOAL OF LESS THAN 7.0% (HCC): ICD-10-CM

## 2024-12-03 DIAGNOSIS — Z23 ENCOUNTER FOR IMMUNIZATION: ICD-10-CM

## 2024-12-03 DIAGNOSIS — F41.1 GENERALIZED ANXIETY DISORDER: ICD-10-CM

## 2024-12-03 LAB — SL AMB POCT HEMOGLOBIN AIC: 6.4 (ref ?–6.5)

## 2024-12-03 PROCEDURE — 83036 HEMOGLOBIN GLYCOSYLATED A1C: CPT

## 2024-12-03 PROCEDURE — 90471 IMMUNIZATION ADMIN: CPT

## 2024-12-03 PROCEDURE — 99396 PREV VISIT EST AGE 40-64: CPT

## 2024-12-03 PROCEDURE — 90673 RIV3 VACCINE NO PRESERV IM: CPT

## 2024-12-03 NOTE — ASSESSMENT & PLAN NOTE
A1c 6.4 today.  Continue metformin.  Will continue to monitor.  Educated on healthy diet and activity.  Foot exam normal today.  Lab Results   Component Value Date    HGBA1C 6.4 12/03/2024       Orders:    POCT hemoglobin A1c    Comprehensive metabolic panel; Future    CBC and differential; Future

## 2024-12-03 NOTE — ASSESSMENT & PLAN NOTE
Depression Screening Follow-up Plan: Patient's depression screening was positive with a PHQ-2 score of 6. Their PHQ-9 score was 25.   Patient denies any recent SI.  Denies SI/HI.  States this was in the past.  Does not have any of this now, and has no concrete plans of this.  Does follow with psych.  Educated on hotlines or ER if these occur.

## 2024-12-03 NOTE — ASSESSMENT & PLAN NOTE
At goal today.  Continue amlodipine, metoprolol, Benicar.  Educated on monitoring at home blood pressures and contact office if persistently elevated.

## 2024-12-03 NOTE — ASSESSMENT & PLAN NOTE
Would like to see [facility] neurology.  Will put referral in for this today.  Has never done PT/OT.  Will put in referrals for these as well.  Continue mexiletine 150 mg 3 times daily.  Orders:    Ambulatory Referral to Neurology; Future    Ambulatory Referral to Physical Therapy; Future    Ambulatory Referral to Occupational Therapy; Future

## 2024-12-03 NOTE — PROGRESS NOTES
Adult Annual Physical  Name: Abiel Ernandez      : 1975      MRN: [id number]  Encounter Provider: Juan Webb PA-C  Encounter Date: 12/3/2024   Encounter department: [facility]    Assessment & Plan  Annual physical exam         Type 2 diabetes mellitus with hemoglobin A1c goal of less than 7.0% ([facility])  A1c 6.4 today.  Continue metformin.  Will continue to monitor.  Educated on healthy diet and activity.  Foot exam normal today.  Lab Results   Component Value Date    HGBA1C 6.4 2024       Orders:    POCT hemoglobin A1c    Comprehensive metabolic panel; Future    CBC and differential; Future    Essential hypertension with goal blood pressure less than 140/90  At goal today.  Continue amlodipine, metoprolol, Benicar.  Educated on monitoring at home blood pressures and contact office if persistently elevated.       Intermittent asthma with reliever use up to twice per week without complication  Stable.  Not in acute exacerbation today.  Contact office if this occurs.       Gastroesophageal reflux disease, unspecified whether esophagitis present  Stable on Prilosec 20 mg every morning and Pepcid 20 mg 2 times daily.  Contact office if any worsening.  No red flag symptoms today.       Neuropathy  Stable on duloxetine.  Contact office if any worsening.       Myotonia congenita, autosomal dominant  Would like to see [facility] neurology.  Will put referral in for this today.  Has never done PT/OT.  Will put in referrals for these as well.  Continue mexiletine 150 mg 3 times daily.  Orders:    Ambulatory Referral to Neurology; Future    Ambulatory Referral to Physical Therapy; Future    Ambulatory Referral to Occupational Therapy; Future    Generalized anxiety disorder  Continue current management per psych.       Dyslipidemia, goal LDL below 130  Will continue to monitor.  Educated on healthy diet and activity.  Continue Zetia 10 mg daily.  Orders:    Lipid panel; Future    Severe  episode of recurrent major depressive disorder, without psychotic features (HCC)  Depression Screening Follow-up Plan: Patient's depression screening was positive with a PHQ-2 score of 6. Their PHQ-9 score was 25.   Patient denies any recent SI.  Denies SI/HI.  States this was in the past.  Does not have any of this now, and has no concrete plans of this.  Does follow with psych.  Educated on hotlines or ER if these occur.       Encounter for immunization    Orders:    influenza vaccine, recombinant, PF, 0.5 mL IM (Flublok)    Screening for malignant neoplasm of prostate    Orders:    PSA, total and free; Future    Immunizations and preventive care screenings were discussed with patient today. Appropriate education was printed on patient's after visit summary.    Discussed risks and benefits of prostate cancer screening. We discussed the controversial history of PSA screening for prostate cancer in the [place] as well as the risk of over detection and over treatment of prostate cancer by way of PSA screening.  The patient understands that PSA blood testing is an imperfect way to screen for prostate cancer and that elevated PSA levels in the blood may also be caused by infection, inflammation, prostatic trauma or manipulation, urological procedures, or by benign prostatic enlargement.    The role of the digital rectal examination in prostate cancer screening was also discussed and I discussed with him that there is large interobserver variability in the findings of digital rectal examination.    Counseling:  Alcohol/drug use: discussed moderation in alcohol intake, the recommendations for healthy alcohol use, and avoidance of illicit drug use.  Dental Health: discussed importance of regular tooth brushing, flossing, and dental visits.  Injury prevention: discussed safety/seat belts, safety helmets, smoke detectors, carbon monoxide detectors, and smoking near bedding or upholstery.  Sexual health: discussed  sexually transmitted diseases, partner selection, use of condoms, avoidance of unintended pregnancy, and contraceptive alternatives.  Exercise: the importance of regular exercise/physical activity was discussed. Recommend exercise 3-5 times per week for at least 30 minutes.       Depression Screening and Follow-up Plan: Patient's depression screening was positive with a PHQ-2 score of 6. Their PHQ-9 score was 25. Patient assessed for underlying major depression. Brief counseling provided and recommend additional follow-up/re-evaluation next office visit.         History of Present Illness     Adult Annual Physical:  Patient presents for annual physical. Patient is a 49-year-old male presenting for annual physical.  Would like referral to [facility] neurology today for myotonia congenita.  No other questions or concerns today..     Diet and Physical Activity:  - Diet/Nutrition: well balanced diet and limited junk food.  - Exercise: no formal exercise.    Depression Screening:  - PHQ-2 Score: 6  - PHQ-9 Score: 25    General Health:  - Sleep: sleeps well.  - Hearing: normal hearing bilateral ears.  - Vision: no vision problems and wears glasses.  - Dental: brushes teeth twice daily.     Health:  - History of STDs: no.   - Urinary symptoms: none.     Review of Systems   Constitutional:  Negative for appetite change, chills, diaphoresis, fatigue and fever.   HENT:  Negative for congestion, ear discharge, ear pain, postnasal drip, rhinorrhea, sinus pressure, sinus pain, sneezing and sore throat.    Eyes:  Negative for pain, discharge, redness, itching and visual disturbance.   Respiratory:  Negative for apnea, cough, chest tightness, shortness of breath and wheezing.    Cardiovascular:  Negative for chest pain, palpitations and leg swelling.   Gastrointestinal:  Negative for abdominal pain, blood in stool, constipation, diarrhea, nausea and vomiting.   Endocrine: Negative for cold intolerance, heat intolerance,  polydipsia and polyuria.   Genitourinary:  Negative for dysuria, flank pain, frequency, hematuria and urgency.   Musculoskeletal:  Positive for myalgias. Negative for arthralgias, back pain, neck pain and neck stiffness.   Skin:  Negative for color change and rash.   Allergic/Immunologic: Negative.    Neurological:  Positive for weakness. Negative for dizziness, tremors, seizures, syncope, facial asymmetry, speech difficulty, light-headedness, numbness and headaches.   Hematological:  Negative for adenopathy. Does not bruise/bleed easily.   Psychiatric/Behavioral:  Negative for agitation, confusion, decreased concentration, dysphoric mood, hallucinations, self-injury, sleep disturbance and suicidal ideas. The patient is not nervous/anxious and is not hyperactive.    All other systems reviewed and are negative.    Medical History Reviewed by provider this encounter:  Tobacco  Allergies  Meds  Problems  Med Hx  Surg Hx  Fam Hx     .  Current Outpatient Medications on File Prior to Visit   Medication Sig Dispense Refill    amLODIPine (NORVASC) 10 mg tablet take 1 tablet by mouth every morning 90 tablet 1    cloNIDine (CATAPRES) 0.1 mg tablet Take 1 tablet (0.1 mg total) by mouth 2 (two) times a day as needed for high blood pressure 60 tablet 5    DULoxetine (CYMBALTA) 30 mg delayed release capsule Take 30 mg by mouth every morning      DULoxetine (CYMBALTA) 60 mg delayed release capsule Take 60 mg by mouth every morning      ezetimibe (ZETIA) 10 mg tablet Take 10 mg by mouth every morning      famotidine (PEPCID) 20 mg tablet take 1 tablet by mouth twice a day 180 tablet 1    GARLIC PO Take 100 mg by mouth daily      hydrOXYzine HCL (ATARAX) 25 mg tablet Take 25 mg by mouth daily at bedtime      Jardiance 10 MG TABS tablet Take 1 tablet (10 mg total) by mouth every morning 90 tablet 3    metFORMIN (GLUCOPHAGE) 500 mg tablet Take 500 mg by mouth 2 (two) times a day with meals      metoprolol succinate (TOPROL-XL)  "50 mg 24 hr tablet Take 0.5 tablets (25 mg total) by mouth 2 (two) times a day 90 tablet 3    mexiletine (MEXITIL) 150 mg capsule Take 150 mg by mouth 3 (three) times a day      Multiple Vitamin (Multi-Vitamin) tablet Take 1 tablet by mouth daily      olmesartan-hydrochlorothiazide (BENICAR HCT) 40-25 MG per tablet Take 1 tablet by mouth daily 90 tablet 0    omeprazole (PriLOSEC) 20 mg delayed release capsule Take 1 capsule (20 mg total) by mouth every morning 90 capsule 0    OneTouch Verio test strip 2 (two) times a day Use to test blood sugar as directed      lamoTRIgine (LaMICtal) 25 mg tablet Take 100 mg by mouth every morning (Patient not taking: Reported on 12/3/2024)      tobramycin-dexamethasone (TOBRADEX) ophthalmic suspension Apply 1 drop to eye 4 (four) times a day (Patient not taking: Reported on 12/3/2024)       No current facility-administered medications on file prior to visit.      Social History     Tobacco Use    Smoking status: Former     Types: Cigarettes    Smokeless tobacco: Never   Vaping Use    Vaping status: Never Used   Substance and Sexual Activity    Alcohol use: Not Currently     Alcohol/week: 3.0 standard drinks of alcohol     Types: 3 Standard drinks or equivalent per week    Drug use: Never    Sexual activity: Not on file       Objective   BP 90/78 (BP Location: Left arm, Patient Position: Sitting)   Pulse 78   Temp (!) 96.9 °F (36.1 °C) (Tympanic)   Ht 5' 7\" (1.702 m)   Wt 85.7 kg (189 lb)   SpO2 98%   BMI 29.60 kg/m²     Physical Exam  Vitals and nursing note reviewed.   Constitutional:       General: He is not in acute distress.     Appearance: Normal appearance. He is well-developed. He is obese. He is not ill-appearing, toxic-appearing or diaphoretic.   HENT:      Head: Normocephalic and atraumatic.      Right Ear: Tympanic membrane normal.      Left Ear: Tympanic membrane normal.      Nose: Nose normal.      Mouth/Throat:      Mouth: Mucous membranes are moist.      " Pharynx: Oropharynx is clear.   Eyes:      Extraocular Movements: Extraocular movements intact.      Conjunctiva/sclera: Conjunctivae normal.      Pupils: Pupils are equal, round, and reactive to light.   Cardiovascular:      Rate and Rhythm: Normal rate and regular rhythm.      Pulses: Normal pulses. no weak pulses.           Dorsalis pedis pulses are 2+ on the right side and 2+ on the left side.        Posterior tibial pulses are 2+ on the right side and 2+ on the left side.      Heart sounds: Normal heart sounds. No murmur heard.  Pulmonary:      Effort: Pulmonary effort is normal. No respiratory distress.      Breath sounds: Normal breath sounds. No wheezing.   Chest:      Chest wall: No tenderness.   Abdominal:      General: Bowel sounds are normal.      Palpations: Abdomen is soft. There is no mass.      Tenderness: There is no abdominal tenderness.   Musculoskeletal:         General: No swelling or tenderness. Normal range of motion.      Cervical back: Normal range of motion and neck supple. No tenderness.      Right lower leg: No edema.      Left lower leg: No edema.   Feet:      Right foot:      Skin integrity: No ulcer, skin breakdown, erythema, warmth, callus or dry skin.      Left foot:      Skin integrity: No ulcer, skin breakdown, erythema, warmth, callus or dry skin.   Lymphadenopathy:      Cervical: No cervical adenopathy.   Skin:     General: Skin is warm and dry.      Capillary Refill: Capillary refill takes less than 2 seconds.      Findings: No erythema, lesion or rash.   Neurological:      General: No focal deficit present.      Mental Status: He is alert and oriented to person, place, and time. Mental status is at baseline.      Cranial Nerves: No cranial nerve deficit.      Coordination: Coordination normal.      Gait: Gait normal.   Psychiatric:         Mood and Affect: Mood normal.         Behavior: Behavior normal.         Thought Content: Thought content normal.         Judgment: Judgment  normal.       Diabetic Foot Exam    Patient's shoes and socks removed.    Right Foot/Ankle   Right Foot Inspection  Skin Exam: skin normal and skin intact. No dry skin, no warmth, no callus, no erythema, no maceration, no abnormal color, no pre-ulcer, no ulcer and no callus.     Toe Exam: ROM and strength within normal limits.     Sensory   Monofilament testing: intact    Vascular  Capillary refills: < 3 seconds  The right DP pulse is 2+. The right PT pulse is 2+.     Left Foot/Ankle  Left Foot Inspection  Skin Exam: skin normal and skin intact. No dry skin, no warmth, no erythema, no maceration, normal color, no pre-ulcer, no ulcer and no callus.     Toe Exam: ROM and strength within normal limits.     Sensory   Monofilament testing: intact    Vascular  Capillary refills: < 3 seconds  The left DP pulse is 2+. The left PT pulse is 2+.     Assign Risk Category  No deformity present  No loss of protective sensation  No weak pulses  Risk: 0

## 2024-12-03 NOTE — ASSESSMENT & PLAN NOTE
Stable on Prilosec 20 mg every morning and Pepcid 20 mg 2 times daily.  Contact office if any worsening.  No red flag symptoms today.

## 2024-12-03 NOTE — ASSESSMENT & PLAN NOTE
Will continue to monitor.  Educated on healthy diet and activity.  Continue Zetia 10 mg daily.  Orders:    Lipid panel; Future

## 2025-01-03 ENCOUNTER — TELEPHONE (OUTPATIENT)
Dept: FAMILY MEDICINE CLINIC | Facility: CLINIC | Age: 50
End: 2025-01-03

## 2025-01-03 NOTE — TELEPHONE ENCOUNTER
Lm advising pt that he has labs due and to please complete within the next 2 weeks. Advised to let us know if he needs them faxed outside of [facility]

## 2025-01-10 DIAGNOSIS — I1A.0 RESISTANT HYPERTENSION: ICD-10-CM

## 2025-01-13 RX ORDER — OLMESARTAN MEDOXOMIL AND HYDROCHLOROTHIAZIDE 40/25 40; 25 MG/1; MG/1
1 TABLET ORAL DAILY
Qty: 90 TABLET | Refills: 0 | Status: SHIPPED | OUTPATIENT
Start: 2025-01-13

## 2025-01-28 DIAGNOSIS — I10 ESSENTIAL HYPERTENSION WITH GOAL BLOOD PRESSURE LESS THAN 140/90: ICD-10-CM

## 2025-01-28 DIAGNOSIS — K21.9 GASTROESOPHAGEAL REFLUX DISEASE, UNSPECIFIED WHETHER ESOPHAGITIS PRESENT: ICD-10-CM

## 2025-01-28 DIAGNOSIS — K21.00 GASTROESOPHAGEAL REFLUX DISEASE WITH ESOPHAGITIS WITHOUT HEMORRHAGE: ICD-10-CM

## 2025-01-28 DIAGNOSIS — G62.9 NEUROPATHY: Primary | ICD-10-CM

## 2025-01-28 DIAGNOSIS — E11.9 TYPE 2 DIABETES MELLITUS WITH HEMOGLOBIN A1C GOAL OF LESS THAN 7.0% (HCC): ICD-10-CM

## 2025-01-29 RX ORDER — METOPROLOL SUCCINATE 50 MG/1
25 TABLET, EXTENDED RELEASE ORAL 2 TIMES DAILY
Qty: 90 TABLET | Refills: 1 | Status: SHIPPED | OUTPATIENT
Start: 2025-01-29

## 2025-01-29 RX ORDER — AMLODIPINE BESYLATE 10 MG/1
10 TABLET ORAL EVERY MORNING
Qty: 90 TABLET | Refills: 0 | OUTPATIENT
Start: 2025-01-29

## 2025-01-29 RX ORDER — MEXILETINE HYDROCHLORIDE 150 MG/1
150 CAPSULE ORAL 3 TIMES DAILY
Qty: 270 CAPSULE | Refills: 3 | Status: SHIPPED | OUTPATIENT
Start: 2025-01-29

## 2025-01-29 RX ORDER — FAMOTIDINE 20 MG/1
20 TABLET, FILM COATED ORAL 2 TIMES DAILY
Qty: 180 TABLET | Refills: 0 | OUTPATIENT
Start: 2025-01-29

## 2025-01-29 RX ORDER — EMPAGLIFLOZIN 10 MG/1
10 TABLET, FILM COATED ORAL EVERY MORNING
Qty: 90 TABLET | Refills: 3 | Status: SHIPPED | OUTPATIENT
Start: 2025-01-29

## 2025-01-29 NOTE — TELEPHONE ENCOUNTER
Left vm for pt that he needs to go for updated bloodwork  and he will be given a courtesy refill today

## 2025-02-20 DIAGNOSIS — I10 ESSENTIAL HYPERTENSION WITH GOAL BLOOD PRESSURE LESS THAN 140/90: ICD-10-CM

## 2025-02-20 RX ORDER — AMLODIPINE BESYLATE 10 MG/1
10 TABLET ORAL EVERY MORNING
Qty: 90 TABLET | Refills: 1 | Status: SHIPPED | OUTPATIENT
Start: 2025-02-20

## 2025-04-04 DIAGNOSIS — I1A.0 RESISTANT HYPERTENSION: ICD-10-CM

## 2025-04-04 DIAGNOSIS — K21.00 GASTROESOPHAGEAL REFLUX DISEASE WITH ESOPHAGITIS WITHOUT HEMORRHAGE: ICD-10-CM

## 2025-04-04 RX ORDER — OLMESARTAN MEDOXOMIL AND HYDROCHLOROTHIAZIDE 40/25 40; 25 MG/1; MG/1
1 TABLET ORAL DAILY
Qty: 30 TABLET | Refills: 0 | Status: SHIPPED | OUTPATIENT
Start: 2025-04-04

## 2025-04-04 RX ORDER — OLMESARTAN MEDOXOMIL AND HYDROCHLOROTHIAZIDE 40/25 40; 25 MG/1; MG/1
1 TABLET ORAL DAILY
Qty: 90 TABLET | Refills: 0 | OUTPATIENT
Start: 2025-04-04

## 2025-04-04 RX ORDER — OMEPRAZOLE 20 MG/1
20 CAPSULE, DELAYED RELEASE ORAL EVERY MORNING
Qty: 90 CAPSULE | Refills: 0 | Status: SHIPPED | OUTPATIENT
Start: 2025-04-04

## 2025-04-05 DIAGNOSIS — K21.9 GASTROESOPHAGEAL REFLUX DISEASE, UNSPECIFIED WHETHER ESOPHAGITIS PRESENT: ICD-10-CM

## 2025-04-07 ENCOUNTER — OFFICE VISIT (OUTPATIENT)
Dept: PODIATRY | Facility: CLINIC | Age: 50
End: 2025-04-07
Payer: COMMERCIAL

## 2025-04-07 VITALS
HEART RATE: 76 BPM | WEIGHT: 189 LBS | OXYGEN SATURATION: 98 % | BODY MASS INDEX: 29.66 KG/M2 | RESPIRATION RATE: 16 BRPM | TEMPERATURE: 98.6 F | HEIGHT: 67 IN

## 2025-04-07 DIAGNOSIS — L60.0 INGROWN RIGHT BIG TOENAIL: Primary | ICD-10-CM

## 2025-04-07 DIAGNOSIS — G62.9 NEUROPATHY: ICD-10-CM

## 2025-04-07 DIAGNOSIS — E11.9 TYPE 2 DIABETES MELLITUS WITH HEMOGLOBIN A1C GOAL OF LESS THAN 7.0% (HCC): ICD-10-CM

## 2025-04-07 PROCEDURE — 99203 OFFICE O/P NEW LOW 30 MIN: CPT | Performed by: PODIATRIST

## 2025-04-07 PROCEDURE — 11730 AVULSION NAIL PLATE SIMPLE 1: CPT | Performed by: PODIATRIST

## 2025-04-07 RX ORDER — MUPIROCIN 20 MG/G
OINTMENT TOPICAL DAILY
Qty: 22 G | Refills: 0 | Status: SHIPPED | OUTPATIENT
Start: 2025-04-07

## 2025-04-07 RX ORDER — FAMOTIDINE 20 MG/1
20 TABLET, FILM COATED ORAL 2 TIMES DAILY
Qty: 180 TABLET | Refills: 1 | Status: SHIPPED | OUTPATIENT
Start: 2025-04-07

## 2025-04-07 NOTE — PROGRESS NOTES
"Name: Abiel Ernandez      : 1975      MRN: [id number]  Encounter Provider: Barbra Barcenas DPM  Encounter Date: 2025   [facility]  :  Assessment & Plan  Ingrown toenail of left foot                IMPRESSION:  Right lateral nail border ingrown  Diabetic polyneuropathy  DM2 A1c 6.4%    PLAN:  RLE GT ingrown without acute SOI.  Patient counseled on treatment modalities such as slant back, partial nail avulsion, and permanent partial nail avulsion.  Patient elected to proceed with PNA.  Risks and benefits reviewed at length.  Patient understands that increased risk for complications due to diabetes  PCP note from 12/3/2024  RLE GT PNA performed to patient's tolerance without incident  Postprocedure care reviewed at length.  Diabetic footcare reviewed.  Diabetic foot evaluation performed  Patient encouraged to check his feet daily  Patient encouraged to wear supportive wide toebox shoe to prevent pressure and rubbing and irritation to ingrown toenails  Patient counseled on diabetic polyneuropathy  Follow-up 2 weeks for repeat evaluation of procedure site.  Recommend routine diabetic foot evaluation every 3 months      Nail removal    Date/Time: 2025 10:45 AM    Performed by: Barbra Barcenas DPM  Authorized by: Barbra Barcenas DPM    Patient location:  ClinicUniversal Protocol:  procedure performed by consultantConsent: Verbal consent obtained.  Risks and benefits: risks, benefits and alternatives were discussed  Consent given by: patient  Time out: Immediately prior to procedure a \"time out\" was called to verify the correct patient, procedure, equipment, support staff and site/side marked as required.  Patient understanding: patient states understanding of the procedure being performed  Patient identity confirmed: verbally with patient    Location:     Foot:  R big toe  Pre-procedure details:     Skin preparation:  Betadine and alcohol    Preparation: " "Patient was prepped and draped in the usual sterile fashion    Anesthesia (see MAR for exact dosages):     Anesthesia method:  Local infiltration    Local anesthetic:  Lidocaine 1% w/o epi  Nail Removal:     Nail removed:  Partial    Nail bed sutured: no    Ingrown nail:     Wedge excision of skin: no      Nail matrix removed or ablated:  None  Nails trimmed:     Number of nails trimmed:  0  Post-procedure details:     Dressing:  4x4 sterile gauze and antibiotic ointment    Patient tolerance of procedure:  Tolerated well, no immediate complications       History of Present Illness   HPI  Abiel Ernandez is a 49 y.o. male who presents for evaluation management of painful ingrown toenail on the right great toe.  Patient states he has a history of ingrown toenails however he has been trimming them at home himself.  Patient states he has had an increase in pain and was unable to trim his toenail properly.  He is concerned due to his diabetes and thought he should present for evaluation and further management.  Patient denies any purulent drainage.  He states the area has become more red and swollen over the last 3 days.  He denies any systemic signs of infection such as nausea, vomiting, fever, chills, shortness of breath.            Review of Systems   Constitutional:  Negative for chills and fever.   Respiratory:  Negative for shortness of breath.    Cardiovascular:  Negative for leg swelling.   Musculoskeletal:  Positive for arthralgias.   Skin:  Positive for wound.          Objective   Pulse 76   Temp 98.6 °F (37 °C)   Resp 16   Ht 5' 7\" (1.702 m)   Wt 85.7 kg (189 lb)   SpO2 98%   BMI 29.60 kg/m²      Physical Exam  Cardiovascular:      Pulses: no weak pulses.           Dorsalis pedis pulses are 2+ on the right side and 2+ on the left side.        Posterior tibial pulses are 1+ on the right side and 1+ on the left side.   Musculoskeletal:      Comments: Mild hammertoe deformity second and third digit " bilaterally   Feet:      Right foot:      Skin integrity: No ulcer or callus.      Left foot:      Skin integrity: No ulcer or callus.       Diabetic Foot Exam    Patient's shoes and socks removed.    Right Foot/Ankle   Right Foot Inspection  Skin Exam: Skin not intact, no callus, no ulcer and no callus.     Toe Exam: tenderness and right toe deformity.     Sensory   Monofilament testing: absent    Vascular  Capillary refills: < 3 seconds  The right DP pulse is 2+. The right PT pulse is 1+.     Left Foot/Ankle  Left Foot Inspection  Skin Exam: Skin not intact, no ulcer and no callus.     Toe Exam: tenderness and left toe deformity.     Sensory   Monofilament testing: absent    Vascular  Capillary refills: < 3 seconds  The left DP pulse is 2+. The left PT pulse is 1+.     Assign Risk Category  Deformity present  Loss of protective sensation  No weak pulses  Risk: 2

## 2025-04-07 NOTE — PATIENT INSTRUCTIONS
Ingrown Toenail Procedure     Post-Procedure Care:    Dressing Care:  Leave the bandage on for 24 hours unless otherwise instructed.  After removing the bandage, clean the toe daily with warm water and mild soap. Pat dry gently.  Soaking:  Begin soaking the toe 24-48 hours after the procedure.  Soak the toe in warm water with Epsom salt for 10-15 minutes, 1-2 times a day.  Pat dry after each soak.  Medication:  Apply a thin layer of antibiotic ointment (e.g., Neosporin or prescription mupirocin ointment) to the surgical site after soaking and drying.  Cover with a clean, sterile bandage.  Take over-the-counter pain relievers like acetaminophen or ibuprofen as needed for discomfort.  Activity:  Avoid strenuous activities, running, or prolonged standing for the first few days.  Keep your foot elevated when resting to reduce swelling.  Do not submerge the foot in bath water, pool water, hot tub, or ocean water  Footwear:  Wear open-toed or loose-fitting shoes to avoid pressure on the surgical site.  Avoid tight or restrictive footwear during the healing process.  Signs of Infection:  Watch for signs of infection, including increased redness, swelling, warmth, pain, or pus drainage.  Contact your healthcare provider immediately if you notice these symptoms or develop a fever.  Follow-Up:  Attend all scheduled follow-up appointments to ensure proper healing.  Follow-up 2 weeks after procedure  Healing Timeline:  Healing may take 2-6 weeks. The surgical site will drain a yellowish fluid, which is normal.    When to Call Your Doctor  Severe pain not relieved by medication.  Excessive bleeding.  Persistent or worsening redness and swelling.  Foul-smelling drainage.

## 2025-04-21 ENCOUNTER — TELEPHONE (OUTPATIENT)
Dept: PODIATRY | Facility: CLINIC | Age: 50
End: 2025-04-21

## 2025-05-05 ENCOUNTER — OFFICE VISIT (OUTPATIENT)
Dept: PODIATRY | Facility: CLINIC | Age: 50
End: 2025-05-05
Payer: COMMERCIAL

## 2025-05-05 VITALS
RESPIRATION RATE: 16 BRPM | WEIGHT: 189 LBS | BODY MASS INDEX: 29.66 KG/M2 | OXYGEN SATURATION: 98 % | TEMPERATURE: 98.3 F | HEIGHT: 67 IN | HEART RATE: 73 BPM

## 2025-05-05 DIAGNOSIS — L60.0 INGROWN RIGHT BIG TOENAIL: ICD-10-CM

## 2025-05-05 DIAGNOSIS — E11.9 TYPE 2 DIABETES MELLITUS WITH HEMOGLOBIN A1C GOAL OF LESS THAN 7.0% (HCC): Primary | ICD-10-CM

## 2025-05-05 DIAGNOSIS — G62.9 NEUROPATHY: ICD-10-CM

## 2025-05-05 PROCEDURE — 99212 OFFICE O/P EST SF 10 MIN: CPT | Performed by: PODIATRIST

## 2025-05-05 NOTE — PROGRESS NOTES
"Name: Abiel Ernandez      : 1975      MRN: [id number]  Encounter Provider: Barbra Barcenas DPM  Encounter Date: 2025   Encounter department: [facility]ODI[facility] [place]  :  Assessment & Plan  Type 2 diabetes mellitus with hemoglobin A1c goal of less than 7.0% ([facility])    Lab Results   Component Value Date    HGBA1C 6.4 2024            Ingrown right big toenail         Neuropathy               IMPRESSION:  Right lateral nail border ingrown s/p PNA 25  Diabetic polyneuropathy  DM2 A1c 6.4%     PLAN:  RLE GT ingrown lateral nail border resolved.  No SOI   Patient counseled on ingrown toenail reoccurrence and will contact the office if any questions or concerns arise  Diabetic footcare briefly reviewed  Patient counseled to continue to wear wide toebox shoes with mesh.  Patient will follow-up in 3 months for diabetic foot check    History of Present Illness   HPI  Abiel Ernandez is a 49 y.o. male who presents for repeat evaluation and management of right lateral great toe PNA.  Patient states he soaked his foot and applied mupirocin ointment as directed for over a week.  He states his pain, swelling, redness had completely resolved so he discontinued postprocedure care.  Patient denies any pedal complaints.      Review of Systems  Constitutional:  Negative for chills and fever.   Respiratory:  Negative for shortness of breath.    Cardiovascular:  Negative for leg swelling.   Musculoskeletal:  Positive for arthralgias.   Skin: Negative       Objective   Pulse 73   Temp 98.3 °F (36.8 °C)   Resp 16   Ht 5' 7\" (1.702 m)   Wt 85.7 kg (189 lb)   SpO2 98%   BMI 29.60 kg/m²      Physical Exam  Physical Exam  Cardiovascular:      Pulses: no weak pulses.           Dorsalis pedis pulses are 2+ on the right side and 2+ on the left side.        Posterior tibial pulses are 1+ on the right side and 1+ on the left side.   Musculoskeletal:      Comments: Mild hammertoe deformity second and third digit " bilaterally   Feet:      Right foot:      Skin integrity: No ulcer or callus.  Right great toe lateral nail border without significant erythema or edema.  No drainage, no pain with palpation.  Incurvated nail resolved     Left foot:      Skin integrity: No ulcer or callus.

## 2025-05-13 DIAGNOSIS — I10 ESSENTIAL HYPERTENSION WITH GOAL BLOOD PRESSURE LESS THAN 140/90: ICD-10-CM

## 2025-05-13 DIAGNOSIS — I1A.0 RESISTANT HYPERTENSION: ICD-10-CM

## 2025-05-13 RX ORDER — OLMESARTAN MEDOXOMIL AND HYDROCHLOROTHIAZIDE 40/25 40; 25 MG/1; MG/1
1 TABLET ORAL DAILY
Qty: 90 TABLET | Refills: 1 | Status: SHIPPED | OUTPATIENT
Start: 2025-05-13

## 2025-05-13 RX ORDER — METOPROLOL SUCCINATE 50 MG/1
25 TABLET, EXTENDED RELEASE ORAL 2 TIMES DAILY
Qty: 90 TABLET | Refills: 0 | OUTPATIENT
Start: 2025-05-13

## 2025-05-13 NOTE — TELEPHONE ENCOUNTER
Reason for call:   [x] Refill   [] Prior Auth  [] Other:     Office:   [x] PCP/Provider - Juan Webb PA-C   [] Specialty/Provider -     Medication:   olmesartan-hydrochlorothiazide (BENICAR HCT) 40-25 MG per tablet Take 1 tablet by mouth daily   30  metoprolol succinate (TOPROL-XL) 50 mg 24 hr tablet Take 0.5 tablets (25 mg total) by mouth 2 (two) times a day   90    Pharmacy: [id number] - [place] [address] [phone]     Local Pharmacy   Does the patient have enough for 3 days?   [] Yes   [x] No - Send as HP to POD    Mail Away Pharmacy   Does the patient have enough for 10 days?   [] Yes   [] No - Send as HP to POD

## 2025-06-03 ENCOUNTER — OFFICE VISIT (OUTPATIENT)
Dept: FAMILY MEDICINE CLINIC | Facility: CLINIC | Age: 50
End: 2025-06-03
Payer: COMMERCIAL

## 2025-06-03 VITALS
TEMPERATURE: 97.3 F | WEIGHT: 194.2 LBS | BODY MASS INDEX: 30.48 KG/M2 | DIASTOLIC BLOOD PRESSURE: 76 MMHG | HEIGHT: 67 IN | HEART RATE: 85 BPM | SYSTOLIC BLOOD PRESSURE: 116 MMHG | OXYGEN SATURATION: 97 %

## 2025-06-03 DIAGNOSIS — F33.2 SEVERE EPISODE OF RECURRENT MAJOR DEPRESSIVE DISORDER, WITHOUT PSYCHOTIC FEATURES (HCC): ICD-10-CM

## 2025-06-03 DIAGNOSIS — K21.9 GASTROESOPHAGEAL REFLUX DISEASE, UNSPECIFIED WHETHER ESOPHAGITIS PRESENT: ICD-10-CM

## 2025-06-03 DIAGNOSIS — E78.5 DYSLIPIDEMIA, GOAL LDL BELOW 130: ICD-10-CM

## 2025-06-03 DIAGNOSIS — E11.9 TYPE 2 DIABETES MELLITUS WITH HEMOGLOBIN A1C GOAL OF LESS THAN 7.0% (HCC): ICD-10-CM

## 2025-06-03 DIAGNOSIS — Z86.0100 HISTORY OF COLON POLYPS: ICD-10-CM

## 2025-06-03 DIAGNOSIS — G62.9 NEUROPATHY: ICD-10-CM

## 2025-06-03 DIAGNOSIS — K76.0 HEPATIC STEATOSIS: ICD-10-CM

## 2025-06-03 DIAGNOSIS — I10 ESSENTIAL HYPERTENSION WITH GOAL BLOOD PRESSURE LESS THAN 140/90: Primary | ICD-10-CM

## 2025-06-03 DIAGNOSIS — G71.12: ICD-10-CM

## 2025-06-03 DIAGNOSIS — F41.1 GENERALIZED ANXIETY DISORDER: ICD-10-CM

## 2025-06-03 DIAGNOSIS — Z12.5 SCREENING FOR MALIGNANT NEOPLASM OF PROSTATE: ICD-10-CM

## 2025-06-03 LAB
CREAT UR-MCNC: 139.8 MG/DL
MICROALBUMIN UR-MCNC: 8.9 MG/L
MICROALBUMIN/CREAT 24H UR: 6 MG/G CREATININE (ref 0–30)
SL AMB POCT HEMOGLOBIN AIC: 6.7 (ref ?–6.5)

## 2025-06-03 PROCEDURE — 83036 HEMOGLOBIN GLYCOSYLATED A1C: CPT

## 2025-06-03 PROCEDURE — 82043 UR ALBUMIN QUANTITATIVE: CPT

## 2025-06-03 PROCEDURE — 82570 ASSAY OF URINE CREATININE: CPT

## 2025-06-03 PROCEDURE — 99214 OFFICE O/P EST MOD 30 MIN: CPT

## 2025-06-03 RX ORDER — LORAZEPAM 0.5 MG/1
0.5 TABLET ORAL
COMMUNITY

## 2025-06-03 NOTE — ASSESSMENT & PLAN NOTE
At goal today.  Continue amlodipie and Benicar.  Encouraged to take at home blood pressures and contact office if persistently elevated.

## 2025-06-03 NOTE — ASSESSMENT & PLAN NOTE
Did not hear from neurology or PT/OT since last visit.  Will place these referrals again today.  Orders:    Ambulatory Referral to Neurology; Future    Ambulatory Referral to Physical Therapy; Future    Ambulatory Referral to Occupational Therapy; Future

## 2025-06-03 NOTE — ASSESSMENT & PLAN NOTE
Colonoscopy completed 7/18/2022.  Unable to find pathology or recommendation in chart.  Will contact "[employer] to get follow-up recommendation, then follow-up with patient.

## 2025-06-03 NOTE — ASSESSMENT & PLAN NOTE
Lab Results   Component Value Date    HGBA1C 6.7 (A) 06/03/2025     A1c at goal.  Continue metformin.  Educated on healthy diet and activity.  Orders:    POCT hemoglobin A1c    Albumin / creatinine urine ratio; Future    Comprehensive metabolic panel; Future    CBC and differential; Future    Lipid Panel with Direct LDL reflex; Future

## 2025-06-03 NOTE — ASSESSMENT & PLAN NOTE
See above.  Orders:    Ambulatory Referral to Neurology; Future    Ambulatory Referral to Physical Therapy; Future    Ambulatory Referral to Occupational Therapy; Future

## 2025-06-03 NOTE — PROGRESS NOTES
Name: Abiel Ernandez      : 1975      MRN: [id number]  Encounter Provider: Juan Webb PA-C  Encounter Date: 6/3/2025   Encounter department: [facility]  :  Assessment & Plan  Essential hypertension with goal blood pressure less than 140/90  At goal today.  Continue amlodipie and Benicar.  Encouraged to take at home blood pressures and contact office if persistently elevated.       Gastroesophageal reflux disease, unspecified whether esophagitis present  Continue omeprazole.  Contact office with worsening.  No red flag signs/symptoms.       Hepatic steatosis  We will continue to monitor liver enzymes.  Will refer to GI if any worsening.       Type 2 diabetes mellitus with hemoglobin A1c goal of less than 7.0% (HCC)    Lab Results   Component Value Date    HGBA1C 6.7 (A) 2025     A1c at goal.  Continue metformin.  Educated on healthy diet and activity.  Orders:    POCT hemoglobin A1c    Albumin / creatinine urine ratio; Future    Comprehensive metabolic panel; Future    CBC and differential; Future    Lipid Panel with Direct LDL reflex; Future    Myotonia congenita, autosomal dominant  Did not hear from neurology or PT/OT since last visit.  Will place these referrals again today.  Orders:    Ambulatory Referral to Neurology; Future    Ambulatory Referral to Physical Therapy; Future    Ambulatory Referral to Occupational Therapy; Future    Neuropathy  See above.  Orders:    Ambulatory Referral to Neurology; Future    Ambulatory Referral to Physical Therapy; Future    Ambulatory Referral to Occupational Therapy; Future    Severe episode of recurrent major depressive disorder, without psychotic features (HCC)  Continue Cymbalta.  Denies SI/HI.  Does follow with psych.         Generalized anxiety disorder  See above.       Dyslipidemia, goal LDL below 130  Will continue to monitor.  Educated on healthy diet and activity.  Continue ezetimibe.  Orders:    Lipid Panel with Direct  LDL reflex; Future    History of colon polyps  Colonoscopy completed 7/18/2022.  Unable to find pathology or recommendation in chart.  Will contact [facility] to get follow-up recommendation, then follow-up with patient.       Screening for malignant neoplasm of prostate    Orders:    PSA, total and free; Future           History of Present Illness   Patient is a 49-year-old male presenting for follow-up.  Patient did not establish with neurology or PT/OT since last visit.  Will need repeat referrals previous today.  Will also need lab work replaced.  No other questions or concerns.      Review of Systems   Constitutional:  Negative for appetite change, chills, diaphoresis, fatigue and fever.   HENT:  Negative for congestion, ear discharge, ear pain, postnasal drip, rhinorrhea, sinus pressure, sinus pain, sneezing and sore throat.    Eyes:  Negative for pain, discharge, redness, itching and visual disturbance.   Respiratory:  Negative for apnea, cough, chest tightness, shortness of breath and wheezing.    Cardiovascular:  Negative for chest pain, palpitations and leg swelling.   Gastrointestinal:  Negative for abdominal pain, blood in stool, constipation, diarrhea, nausea and vomiting.   Endocrine: Negative for cold intolerance, heat intolerance, polydipsia and polyuria.   Genitourinary:  Negative for dysuria, flank pain, frequency, hematuria and urgency.   Musculoskeletal:  Positive for myalgias. Negative for arthralgias, back pain, neck pain and neck stiffness.   Skin:  Negative for color change and rash.   Allergic/Immunologic: Negative.    Neurological:  Positive for weakness and numbness. Negative for dizziness, tremors, seizures, syncope, facial asymmetry, speech difficulty, light-headedness and headaches.   Hematological:  Negative for adenopathy. Does not bruise/bleed easily.   Psychiatric/Behavioral:  Negative for agitation, confusion, decreased concentration, dysphoric mood, hallucinations, self-injury,  "sleep disturbance and suicidal ideas. The patient is not nervous/anxious and is not hyperactive.    All other systems reviewed and are negative.      Objective   /76 (BP Location: Left arm, Patient Position: Sitting)   Pulse 85   Temp (!) 97.3 °F (36.3 °C)   Ht 5' 7\" (1.702 m)   Wt 88.1 kg (194 lb 3.2 oz)   SpO2 97%   BMI 30.42 kg/m²      Physical Exam  Vitals and nursing note reviewed.   Constitutional:       General: He is not in acute distress.     Appearance: Normal appearance. He is well-developed. He is obese. He is not ill-appearing, toxic-appearing or diaphoretic.   HENT:      Head: Normocephalic and atraumatic.      Right Ear: Tympanic membrane normal.      Left Ear: Tympanic membrane normal.      Nose: Nose normal.      Mouth/Throat:      Mouth: Mucous membranes are moist.      Pharynx: Oropharynx is clear.     Eyes:      Extraocular Movements: Extraocular movements intact.      Conjunctiva/sclera: Conjunctivae normal.      Pupils: Pupils are equal, round, and reactive to light.     Neck:      Vascular: No carotid bruit.     Cardiovascular:      Rate and Rhythm: Normal rate and regular rhythm.      Pulses: Normal pulses.      Heart sounds: Normal heart sounds. No murmur heard.  Pulmonary:      Effort: Pulmonary effort is normal. No respiratory distress.      Breath sounds: Normal breath sounds. No wheezing.   Chest:      Chest wall: No tenderness.   Abdominal:      General: Bowel sounds are normal.      Palpations: Abdomen is soft. There is no mass.      Tenderness: There is no abdominal tenderness.     Musculoskeletal:         General: No swelling or tenderness. Normal range of motion.      Cervical back: Normal range of motion and neck supple. No tenderness.      Right lower leg: No edema.      Left lower leg: No edema.   Lymphadenopathy:      Cervical: No cervical adenopathy.     Skin:     General: Skin is warm and dry.      Capillary Refill: Capillary refill takes less than 2 seconds.      " Findings: No erythema, lesion or rash.     Neurological:      General: No focal deficit present.      Mental Status: He is alert and oriented to person, place, and time. Mental status is at baseline.      Cranial Nerves: No cranial nerve deficit.      Motor: Weakness present.      Coordination: Coordination normal.      Gait: Gait normal.     Psychiatric:         Mood and Affect: Mood normal.         Behavior: Behavior normal.         Thought Content: Thought content normal.         Judgment: Judgment normal.

## 2025-06-03 NOTE — ASSESSMENT & PLAN NOTE
Will continue to monitor.  Educated on healthy diet and activity.  Continue ezetimibe.  Orders:    Lipid Panel with Direct LDL reflex; Future

## 2025-06-04 ENCOUNTER — TELEPHONE (OUTPATIENT)
Dept: ADMINISTRATIVE | Facility: OTHER | Age: 50
End: 2025-06-04

## 2025-06-04 ENCOUNTER — RESULTS FOLLOW-UP (OUTPATIENT)
Dept: FAMILY MEDICINE CLINIC | Facility: CLINIC | Age: 50
End: 2025-06-04

## 2025-06-04 ENCOUNTER — APPOINTMENT (OUTPATIENT)
Dept: LAB | Facility: MEDICAL CENTER | Age: 50
End: 2025-06-04
Payer: COMMERCIAL

## 2025-06-04 ENCOUNTER — OFFICE VISIT (OUTPATIENT)
Dept: PODIATRY | Facility: CLINIC | Age: 50
End: 2025-06-04
Payer: COMMERCIAL

## 2025-06-04 VITALS
HEART RATE: 84 BPM | BODY MASS INDEX: 30.45 KG/M2 | TEMPERATURE: 98.7 F | WEIGHT: 194 LBS | RESPIRATION RATE: 16 BRPM | OXYGEN SATURATION: 98 % | HEIGHT: 67 IN

## 2025-06-04 DIAGNOSIS — E11.9 TYPE 2 DIABETES MELLITUS WITH HEMOGLOBIN A1C GOAL OF LESS THAN 7.0% (HCC): Primary | ICD-10-CM

## 2025-06-04 DIAGNOSIS — L60.0 INGROWN TOENAIL: ICD-10-CM

## 2025-06-04 DIAGNOSIS — Z01.818 PRE-OP EXAMINATION: ICD-10-CM

## 2025-06-04 DIAGNOSIS — Z12.5 SCREENING FOR MALIGNANT NEOPLASM OF PROSTATE: ICD-10-CM

## 2025-06-04 DIAGNOSIS — Z12.11 SCREEN FOR COLON CANCER: Primary | ICD-10-CM

## 2025-06-04 DIAGNOSIS — E11.9 TYPE 2 DIABETES MELLITUS WITH HEMOGLOBIN A1C GOAL OF LESS THAN 7.0% (HCC): ICD-10-CM

## 2025-06-04 DIAGNOSIS — G62.9 NEUROPATHY: ICD-10-CM

## 2025-06-04 DIAGNOSIS — Z86.0100 HISTORY OF COLON POLYPS: ICD-10-CM

## 2025-06-04 DIAGNOSIS — E78.5 DYSLIPIDEMIA, GOAL LDL BELOW 130: ICD-10-CM

## 2025-06-04 LAB
ALBUMIN SERPL BCG-MCNC: 4.8 G/DL (ref 3.5–5)
ALP SERPL-CCNC: 41 U/L (ref 34–104)
ALT SERPL W P-5'-P-CCNC: 33 U/L (ref 7–52)
ANION GAP SERPL CALCULATED.3IONS-SCNC: 8 MMOL/L (ref 4–13)
AST SERPL W P-5'-P-CCNC: 25 U/L (ref 13–39)
BASOPHILS # BLD AUTO: 0.06 THOUSANDS/ÂΜL (ref 0–0.1)
BASOPHILS NFR BLD AUTO: 1 % (ref 0–1)
BILIRUB SERPL-MCNC: 0.72 MG/DL (ref 0.2–1)
BUN SERPL-MCNC: 21 MG/DL (ref 5–25)
CALCIUM SERPL-MCNC: 9.9 MG/DL (ref 8.4–10.2)
CHLORIDE SERPL-SCNC: 96 MMOL/L (ref 96–108)
CHOLEST SERPL-MCNC: 331 MG/DL (ref ?–200)
CO2 SERPL-SCNC: 30 MMOL/L (ref 21–32)
CREAT SERPL-MCNC: 1.06 MG/DL (ref 0.6–1.3)
EOSINOPHIL # BLD AUTO: 0.15 THOUSAND/ÂΜL (ref 0–0.61)
EOSINOPHIL NFR BLD AUTO: 3 % (ref 0–6)
ERYTHROCYTE [DISTWIDTH] IN BLOOD BY AUTOMATED COUNT: 12.7 % (ref 11.6–15.1)
GFR SERPL CREATININE-BSD FRML MDRD: 81 ML/MIN/1.73SQ M
GLUCOSE P FAST SERPL-MCNC: 118 MG/DL (ref 65–99)
HCT VFR BLD AUTO: 47.8 % (ref 36.5–49.3)
HDLC SERPL-MCNC: 39 MG/DL
HGB BLD-MCNC: 16 G/DL (ref 12–17)
IMM GRANULOCYTES # BLD AUTO: 0.02 THOUSAND/UL (ref 0–0.2)
IMM GRANULOCYTES NFR BLD AUTO: 0 % (ref 0–2)
LDLC SERPL CALC-MCNC: 221 MG/DL (ref 0–100)
LYMPHOCYTES # BLD AUTO: 2.41 THOUSANDS/ÂΜL (ref 0.6–4.47)
LYMPHOCYTES NFR BLD AUTO: 39 % (ref 14–44)
MCH RBC QN AUTO: 30 PG (ref 26.8–34.3)
MCHC RBC AUTO-ENTMCNC: 33.5 G/DL (ref 31.4–37.4)
MCV RBC AUTO: 90 FL (ref 82–98)
MONOCYTES # BLD AUTO: 0.51 THOUSAND/ÂΜL (ref 0.17–1.22)
MONOCYTES NFR BLD AUTO: 8 % (ref 4–12)
NEUTROPHILS # BLD AUTO: 2.96 THOUSANDS/ÂΜL (ref 1.85–7.62)
NEUTS SEG NFR BLD AUTO: 49 % (ref 43–75)
NRBC BLD AUTO-RTO: 0 /100 WBCS
PLATELET # BLD AUTO: 249 THOUSANDS/UL (ref 149–390)
PMV BLD AUTO: 9.7 FL (ref 8.9–12.7)
POTASSIUM SERPL-SCNC: 3.8 MMOL/L (ref 3.5–5.3)
PROT SERPL-MCNC: 7.4 G/DL (ref 6.4–8.4)
PSA FREE MFR SERPL: 36.3 %
PSA FREE SERPL-MCNC: 0.21 NG/ML
PSA SERPL-MCNC: 0.58 NG/ML (ref 0–4)
RBC # BLD AUTO: 5.33 MILLION/UL (ref 3.88–5.62)
SODIUM SERPL-SCNC: 134 MMOL/L (ref 135–147)
TRIGL SERPL-MCNC: 354 MG/DL (ref ?–150)
WBC # BLD AUTO: 6.11 THOUSAND/UL (ref 4.31–10.16)

## 2025-06-04 PROCEDURE — 85025 COMPLETE CBC W/AUTO DIFF WBC: CPT

## 2025-06-04 PROCEDURE — 80053 COMPREHEN METABOLIC PANEL: CPT

## 2025-06-04 PROCEDURE — 84153 ASSAY OF PSA TOTAL: CPT

## 2025-06-04 PROCEDURE — 36415 COLL VENOUS BLD VENIPUNCTURE: CPT

## 2025-06-04 PROCEDURE — 80061 LIPID PANEL: CPT

## 2025-06-04 PROCEDURE — 84154 ASSAY OF PSA FREE: CPT

## 2025-06-04 PROCEDURE — 99212 OFFICE O/P EST SF 10 MIN: CPT | Performed by: PODIATRIST

## 2025-06-04 NOTE — PROGRESS NOTES
"Name: Abiel Ernandez      : 1975      MRN: [id number]  Encounter Provider: Barbra Barcenas DPM  Encounter Date: 2025   Encounter department: [facility] [place]  :  Assessment & Plan  Type 2 diabetes mellitus with hemoglobin A1c goal of less than 7.0% ([facility])    Lab Results   Component Value Date    HGBA1C 6.7 (A) 2025            Neuropathy         Ingrown toenail         IMPRESSION:  BL incurvated GT nails, no soi  Diabetic polyneuropathy  DM2 A1c 6.4%    BL mild ingrown Left M/L, Righ M  Patient counseled on ingrown toenails.  Slant back performed in remaining nail borders to prevent ingrown as patient is declining further procedures at this time  Patient counseled at length on nail hygiene  Follow-up as needed for nail pain  Diabetic foot care reviewed, neuropathy reviewed. Check feet daily   Diabetic foot exam 3 to 6 months, or sooner if concerns arise     History of Present Illness   HPI  Abiel Ernandez is a 49 y.o. male who presents diabetic for exam and painful great toe nails.       Review of Systems  Constitutional:  Negative for chills and fever.   Respiratory:  Negative for shortness of breath.    Cardiovascular:  Negative for leg swelling.   Musculoskeletal:  Positive for arthralgias.   Skin:  Positive for wound.        Objective   Pulse 84   Temp 98.7 °F (37.1 °C)   Resp 16   Ht 5' 7\" (1.702 m)   Wt 88 kg (194 lb)   SpO2 98%   BMI 30.38 kg/m²      Physical Exam      Cardiovascular:      Pulses: no weak pulses.           Dorsalis pedis pulses are 2+ on the right side and 2+ on the left side.        Posterior tibial pulses are 1+ on the right side and 1+ on the left side.   Musculoskeletal:      Comments: Mild hammertoe deformity second and third digit bilaterally   Feet:      Right foot:      Skin integrity: No ulcer or callus.      Left foot:      Skin integrity: No ulcer or callus.   Bilateral great toe nail incurvated and tender alone medial and lateral boarders " w/o soi         Diabetic Foot Exam     Patient's shoes and socks removed.     Right Foot/Ankle   Right Foot Inspection  Skin Exam: Skin not intact, no callus, no ulcer and no callus.      Toe Exam: tenderness and right toe deformity.      Sensory   Monofilament testing: absent     Vascular  Capillary refills: < 3 seconds  The right DP pulse is 2+. The right PT pulse is 1+.      Left Foot/Ankle  Left Foot Inspection  Skin Exam: Skin not intact, no ulcer and no callus.      Toe Exam: tenderness and left toe deformity.      Sensory   Monofilament testing: absent     Vascular  Capillary refills: < 3 seconds  The left DP pulse is 2+. The left PT pulse is 1+.      Assign Risk Category  Deformity present  Loss of protective sensation  No weak pulses  Risk: 2

## 2025-06-04 NOTE — TELEPHONE ENCOUNTER
Upon review of the In Basket request we were able to locate, review, and update the patient chart as requested for CRC: Colonoscopy.    Any additional questions or concerns should be emailed to the Practice Liaisons via the appropriate education email address, please do not reply via In Basket.    Thank you  Concepcion Galloway MA   [facility]

## 2025-06-04 NOTE — TELEPHONE ENCOUNTER
----- Message from Nadya MARC sent at 6/3/2025  1:33 PM EDT -----  06/03/25 1:33 PMHello, our patient Abiel Ernandez has had CRC: Colonoscopy completed/performed. Please assist in updating the patient chart by making an External outreach to [facility] facility located in [place]. The date of service is 07/2022.Thank you,Nadya Markham, [facility]  ----- Message -----  From: Juan Webb PA-C  Sent: 6/3/2025   1:22 PM EDT  To: [facility]    Can we call [facility] and see when he would be due for colonoscopy?  I went through the chart and couldn't find the recommendation?  Last completed 7/18/22.

## 2025-06-11 DIAGNOSIS — E78.5 DYSLIPIDEMIA, GOAL LDL BELOW 130: Primary | ICD-10-CM

## 2025-06-11 RX ORDER — EZETIMIBE 10 MG/1
10 TABLET ORAL EVERY MORNING
Qty: 90 TABLET | Refills: 3 | Status: SHIPPED | OUTPATIENT
Start: 2025-06-11

## 2025-06-30 DIAGNOSIS — I10 ESSENTIAL HYPERTENSION WITH GOAL BLOOD PRESSURE LESS THAN 140/90: ICD-10-CM

## 2025-06-30 DIAGNOSIS — K21.00 GASTROESOPHAGEAL REFLUX DISEASE WITH ESOPHAGITIS WITHOUT HEMORRHAGE: ICD-10-CM

## 2025-06-30 DIAGNOSIS — E78.5 DYSLIPIDEMIA, GOAL LDL BELOW 130: ICD-10-CM

## 2025-06-30 DIAGNOSIS — I1A.0 RESISTANT HYPERTENSION: ICD-10-CM

## 2025-06-30 DIAGNOSIS — K21.9 GASTROESOPHAGEAL REFLUX DISEASE, UNSPECIFIED WHETHER ESOPHAGITIS PRESENT: ICD-10-CM

## 2025-06-30 DIAGNOSIS — E11.9 TYPE 2 DIABETES MELLITUS WITH HEMOGLOBIN A1C GOAL OF LESS THAN 7.0% (HCC): ICD-10-CM

## 2025-06-30 DIAGNOSIS — G62.9 NEUROPATHY: ICD-10-CM

## 2025-07-02 RX ORDER — EMPAGLIFLOZIN 10 MG/1
10 TABLET, FILM COATED ORAL EVERY MORNING
Qty: 90 TABLET | Refills: 1 | Status: SHIPPED | OUTPATIENT
Start: 2025-07-02

## 2025-07-02 RX ORDER — METOPROLOL SUCCINATE 50 MG/1
25 TABLET, EXTENDED RELEASE ORAL 2 TIMES DAILY
Qty: 90 TABLET | Refills: 1 | Status: SHIPPED | OUTPATIENT
Start: 2025-07-02

## 2025-07-02 RX ORDER — MEXILETINE HYDROCHLORIDE 150 MG/1
150 CAPSULE ORAL 3 TIMES DAILY
Qty: 270 CAPSULE | Refills: 1 | Status: SHIPPED | OUTPATIENT
Start: 2025-07-02

## 2025-07-02 RX ORDER — FAMOTIDINE 20 MG/1
20 TABLET, FILM COATED ORAL 2 TIMES DAILY
Qty: 180 TABLET | Refills: 1 | Status: SHIPPED | OUTPATIENT
Start: 2025-07-02

## 2025-07-02 RX ORDER — AMLODIPINE BESYLATE 10 MG/1
10 TABLET ORAL EVERY MORNING
Qty: 90 TABLET | Refills: 1 | Status: SHIPPED | OUTPATIENT
Start: 2025-07-02

## 2025-07-02 RX ORDER — OMEPRAZOLE 20 MG/1
20 CAPSULE, DELAYED RELEASE ORAL EVERY MORNING
Qty: 90 CAPSULE | Refills: 1 | Status: SHIPPED | OUTPATIENT
Start: 2025-07-02

## 2025-07-02 RX ORDER — OLMESARTAN MEDOXOMIL AND HYDROCHLOROTHIAZIDE 40/25 40; 25 MG/1; MG/1
1 TABLET ORAL DAILY
Qty: 90 TABLET | Refills: 1 | Status: SHIPPED | OUTPATIENT
Start: 2025-07-02

## 2025-07-02 RX ORDER — EZETIMIBE 10 MG/1
10 TABLET ORAL EVERY MORNING
Qty: 90 TABLET | Refills: 1 | Status: SHIPPED | OUTPATIENT
Start: 2025-07-02

## 2025-08-18 ENCOUNTER — TELEPHONE (OUTPATIENT)
Age: 50
End: 2025-08-18

## 2025-08-18 ENCOUNTER — APPOINTMENT (OUTPATIENT)
Dept: RADIOLOGY | Facility: CLINIC | Age: 50
End: 2025-08-18
Payer: COMMERCIAL

## 2025-08-18 ENCOUNTER — OFFICE VISIT (OUTPATIENT)
Dept: URGENT CARE | Facility: CLINIC | Age: 50
End: 2025-08-18
Payer: COMMERCIAL

## 2025-08-18 VITALS
HEART RATE: 65 BPM | WEIGHT: 185 LBS | BODY MASS INDEX: 29.03 KG/M2 | SYSTOLIC BLOOD PRESSURE: 140 MMHG | RESPIRATION RATE: 18 BRPM | HEIGHT: 67 IN | DIASTOLIC BLOOD PRESSURE: 84 MMHG | OXYGEN SATURATION: 98 % | TEMPERATURE: 96.4 F

## 2025-08-18 DIAGNOSIS — M65.4 DE QUERVAIN'S TENOSYNOVITIS, LEFT: Primary | ICD-10-CM

## 2025-08-18 DIAGNOSIS — M79.642 PAIN OF LEFT HAND: ICD-10-CM

## 2025-08-18 PROCEDURE — S9088 SERVICES PROVIDED IN URGENT: HCPCS

## 2025-08-18 PROCEDURE — 99204 OFFICE O/P NEW MOD 45 MIN: CPT

## 2025-08-18 PROCEDURE — 73130 X-RAY EXAM OF HAND: CPT

## 2025-08-18 RX ORDER — METHYLPREDNISOLONE 4 MG/1
TABLET ORAL
Qty: 1 EACH | Refills: 0 | Status: SHIPPED | OUTPATIENT
Start: 2025-08-18